# Patient Record
Sex: FEMALE | Race: WHITE | Employment: FULL TIME | URBAN - NONMETROPOLITAN AREA
[De-identification: names, ages, dates, MRNs, and addresses within clinical notes are randomized per-mention and may not be internally consistent; named-entity substitution may affect disease eponyms.]

---

## 2018-07-24 ENCOUNTER — OFFICE VISIT (OUTPATIENT)
Dept: ORTHOPEDIC SURGERY | Age: 63
End: 2018-07-24

## 2018-07-24 VITALS
BODY MASS INDEX: 23.32 KG/M2 | SYSTOLIC BLOOD PRESSURE: 107 MMHG | HEART RATE: 69 BPM | HEIGHT: 65 IN | WEIGHT: 140 LBS | DIASTOLIC BLOOD PRESSURE: 69 MMHG

## 2018-07-24 DIAGNOSIS — M67.951 TENDINOPATHY OF RIGHT GLUTEUS MEDIUS: ICD-10-CM

## 2018-07-24 DIAGNOSIS — M25.551 RIGHT HIP PAIN: Primary | ICD-10-CM

## 2018-07-24 DIAGNOSIS — M70.61 TROCHANTERIC BURSITIS OF RIGHT HIP: ICD-10-CM

## 2018-07-24 PROCEDURE — 99203 OFFICE O/P NEW LOW 30 MIN: CPT | Performed by: ORTHOPAEDIC SURGERY

## 2018-07-24 RX ORDER — ESTRADIOL 0.03 MG/D
1 FILM, EXTENDED RELEASE TRANSDERMAL DAILY
COMMUNITY
End: 2020-07-06

## 2018-07-24 RX ORDER — ACETAMINOPHEN 160 MG/5ML
15 SOLUTION ORAL EVERY 4 HOURS PRN
COMMUNITY
End: 2020-07-06

## 2018-07-24 RX ORDER — IBUPROFEN 200 MG
200 TABLET ORAL EVERY 6 HOURS PRN
COMMUNITY
End: 2020-07-06

## 2018-07-24 NOTE — PROGRESS NOTES
Madiha Diaz MD  Orthopaedic Surgery & Sports Medicine  Shoulder, Hip & Knee Specialist                       MAIN PHONE NUMBER  [phone]      PATIENT: Forrest Joe    61 y.o.  female  Beckie: 1955   MRN:  [id number]       Date of current encounter: 7/24/2018  This encounter is evaluated as a:       [x] New Patient Visit    [] Established Patient Visit   [] Post-Op Visit     [] Consult: requested by          [] Worker's Comp       Patient's PCP is Dr. Shruti Morgan MD    Subjective:     Chief Complaint   Patient presents with    New Patient     Rt Hip pain pt states that this is a reacurrance of a recent injury which started last November       HPI:  Patient is a very nice 57-year-old woman who comes in today for evaluation of 12 month history of lateral sided right hip pain. She states that the pain was a problem several years ago but recently this has recurred. There is no pain radiating down the leg. There is no pain radiating to the groin. Pain Assessment  Location of Pain:  (hip)  Location Modifiers: Right  Severity of Pain: 3  Quality of Pain: Dull, Aching  Duration of Pain: Other (Comment) (comes and goes)  Frequency of Pain: Intermittent  Date Pain First Started: 07/24/17  Aggravating Factors: Stairs, Walking, Squatting, Exercise  Limiting Behavior: Yes  Relieving Factors: Nsaids, Rest  Result of Injury: No  Work-Related Injury: No  Are there other pain locations you wish to document?: No    There is no problem list on file for this patient. No past medical history on file. No past surgical history on file.     Allergies:  Ciprofloxacin and Codeine    Medications:  Outpatient Prescriptions Marked as Taking for the 7/24/18 encounter (Office Visit) with Madiha Diaz MD   Medication Sig Dispense Refill    Fexofenadine HCl (ALLEGRA ALLERGY PO) Take 180 mg by mouth as needed      ibuprofen (ADVIL;MOTRIN) 200 MG tablet Take 200 mg by mouth every 6 hours as needed for Pain      acetaminophen (TYLENOL) 160 MG/5ML solution Take 15 mg/kg by mouth every 4 hours as needed for Fever      estradiol 0.025 MG/24HR PTTW Place 1 patch onto the skin daily       Social History     Social History    Marital status:      Spouse name: N/A    Number of children: N/A    Years of education: N/A     Occupational History    Not on file. Social History Main Topics    Smoking status: Never Smoker    Smokeless tobacco: Never Used    Alcohol use Not on file    Drug use: Unknown    Sexual activity: Not on file     Other Topics Concern    Not on file     Social History Narrative    No narrative on file     No family history on file. Review of Systems:    [employer] [facility] [employer] [facility] [employer] [facility]'s reported review of systems has been reviewed and has been scanned into her medical record for today's visit. The scanned image can be found in media images folder. She was instructed to contact her primary care physician regarding ROS positives if not already being addressed during today's visit. Objective:   Physical Exam  Vital Signs:  /69   Pulse 69   Ht 5' 5\" (1.651 m)   Wt 140 lb (63.5 kg)   BMI 23.30 kg/m²     Constitution:  Generally, Tere Mann is [x] alert, [x] appears stated age, and [x] in no distress.   Her general body habitus is [] Cachectic  [] Thin  [x] Normal  [] Obese  [] Morbidly Obese    Head: [x] Normocephalic  Eyes: [x] Extra-occular muscles intact  Left Ear: [x] External Ear normal   Right Ear: [x] External Ear normal   Nose: [x] Normal  Mouth: [x] Oral mucosa moist  [x] No perioral lesions    Pulmonary: [x] Respirations unlabored and regular  Skin: [x] Warm [x] Well perfused     Psychiatric:   [x] Good judgement and insight  [x] Oriented to [x] person, [x] place, and [x] time  [x] Mood appropriate for circumstances    Gait:   Gait is [x] Normal  [] Impaired   Assistive Device: [] None  [] Knee Brace  [] Cane  [] Crutches   [] Kate Cristino   [] Wheelchair Kaitlynn Soto is a 61y.o. year old female who presents with Right sided Greater trochanteric pain syndrome. This is a condition which is comprised of trochanteric bursitis, IT band focal tightness, and gluteus medius tendinopathy. It is a chronic condition very commonly seen in this age group. Generally, speaking extra-articular etiologies of hip pain respond extremely well to nonoperative management involving PT, injections and/or NSAIDs, although the rate of clinical improvement is slow and requires patience and consistency with therapy. Diagnosis:    Diagnosis Orders   1. Right hip pain  XR HIP RIGHT (2-3 VIEWS)    [facility] PT - [facility]   2. Tendinopathy of right gluteus medius  [facility] PT - [facility]   3. Trochanteric bursitis of right hip  [facility] PT - [facility]         Plan:     I discussed the diagnosis and the treatment options with Kaitlynn Soto today as well as the nature of the condition. I am going to refer for PT using my specialized program for this condition. Patient wanted to hold off on an injection and I think this is reasonable. Return to Clinic/Follow - Up:  6-8 weeks PRN    Kaitlynn oSto was instructed to call the office if her symptoms worsen or if new symptoms appear prior to the next scheduled visit. She is specifically instructed to contact the office between now & her scheduled appointment if she has concerns related to her condition or if she needs assistance in scheduling the above tests. She is welcome to call for an appointment sooner if she has any additional concerns or questions. Patient Education Materials Provided:  [x] Dr Pepper Britt: New patient folder,  Anatomic Drawings and treatment algorithms    There are no Patient Instructions on file for this visit.        Orders Placed This Encounter   Procedures    XR HIP RIGHT (2-3 VIEWS)     Order Specific Question:   Reason for exam:     Answer:   AP and Marcie Knight [facility] Physical Therapy     Referral Priority:   Routine     Referral Type:   Eval and Treat     Referral Reason:   Specialty Services Required     Requested Specialty:   Physical Therapy     Number of Visits Requested:   1       Refills/New Prescriptions:  No orders of the defined types were placed in this encounter. Today's prescription medications will be e-scribed (when appropriate) to the Patient's Preferred Pharmacy:   [address] [facility] [phone]  [address] [place] [address]  Phone: [phone] Fax: [phone]         Russel Nair MD  Orthopaedic Surgeon, Sports Medicine  Director, Hip Arthroscopy and [address] [place] [address]    Contact Information:  ([place] [address] [phone])  [facility] main number: use after hours [phone])    This dictation was performed with a verbal recognition program (DRAGON) and it was checked for errors. It is possible that there are still dictated errors within this office note. If so, please bring any errors to my attention for an addendum. All efforts were made to ensure that this office note is accurate.

## 2018-07-24 NOTE — LETTER
Hip Rehabilitation Referral    Patient Name: Aggie Qureshi      YOB: 1955    Diagnosis: Right Greater Trochanteric Bursitis / Hip pain     Precautions: N/A    Evaluate and Treat          Stretching and soft tissue mobs:  Strengthening:  [x] IT Band     [x] Core stabilization  [x] Adductors     [x] Glute eccentric progressing to concentric  [x] Hip Flexors     [] Pelvic and trunk strengthening  [x] Gluteals     [] Abductors     [x] Iliopsoas complex    [] Flexors  [x] Rectus femoris     [x] Progressive resistive exercises  [] TFL        [] Sartorius                     Activities:       [x] Kinematic Retraining       [x] Activity modification     [x] Patient education to avoid continued irritation with ADLs  [x] Normalization of gait    Modalities:     Return to Sport:  [x] Ultrasound     [] Plyometrics  [] Iontophoresis    [] Rhythmic stabilization  [x] Moist heat     [] Core strengthening   [x] Massage     [] Sports specific program:      [x] Cryotherapy      [] Electrical stimulation     [] Paraffin  [] Whirlpool  [] TENS  [x] Per therapist discretion  [x] Home exercise program (copy to patient). Perform exercises for:  30    minutes   2- 3      times/day  [x] Supervised physical therapy  Frequency: []  1x week  [x] 2x week  [] 3x week  [] Other:   Duration: [] 2 weeks   [] 4 weeks  [x] 6 weeks  [] Other:     Additional Instructions:       Hip pain   Focus on core stabilization, glute & eccentric hip flexor strengthening progressing to concentric. Soft tissue mobs focus on hip adductors, rectus femoris, iliopsoas, TFL, & gluteals. Kinematic re-training and activity modification as indicated. Trochanteric Bursitis/Glutes Medius Tendinitis/tendinopathy:   Soft tissue mobs to Glutes,  Adductors,  ITB, primary and secondary hip flexors. Core stabilization, pelvic and trunk control exercise, Glute strengthening.   Activity modification and Pt education to avoid continued

## 2018-08-01 ENCOUNTER — HOSPITAL ENCOUNTER (OUTPATIENT)
Dept: OTHER | Age: 63
Discharge: HOME | End: 2018-08-01
Attending: ORTHOPAEDIC SURGERY | Admitting: ORTHOPAEDIC SURGERY

## 2018-08-01 ENCOUNTER — HOSPITAL ENCOUNTER (OUTPATIENT)
Dept: PHYSICAL THERAPY | Age: 63
Discharge: HOME | End: 2018-08-31
Admitting: ORTHOPAEDIC SURGERY

## 2018-08-01 NOTE — PLAN OF CARE
[address] [place] [address] Marisa Carrillo  Phone: [phone]   Fax:     [phone]                                                       Physical Therapy Certification    Dear Referring Practitioner: Baldo St,    We had the pleasure of evaluating the following patient for physical therapy services at [address] [place] [address]. A summary of our findings can be found in the initial assessment below. This includes our plan of care. If you have any questions or concerns regarding these findings, please do not hesitate to contact me at the office phone number checked above. Thank you for the referral.       Physician Signature:_______________________________Date:__________________  By signing above (or electronic signature), therapists plan is approved by physician      Patient: Garry oG   : 1955   MRN: [id number]  Referring Physician: Referring Practitioner: Baldo St      Evaluation Date: 2018      Medical Diagnosis Information:  Diagnosis: Right hip pain, glutes medius tendinopathy, trochanteric bursitis   Treatment Diagnosis: M25.551, M67.98, M70.61                                         Insurance information: PT Insurance Information: UMR     Precautions/ Contra-indications: None  Latex Allergy:  [x]NO      []YES  Preferred Language for Healthcare:   [x]English       []other:    SUBJECTIVE: Patient stated complaint: Patient reports onset of acute-on-chronic right hip pain with most recent exacerbation starting sometime in the fall of last year. She moved into the old home that she and her  rent out as a bed and breakfast and she states that there are lots of stairs in the house, all the repetitive up and down has really been bothering her hip.  She has dealt with this kind of hip pain in the past and with visits to the chiropractor for \"scraping\" it went away. This time she has been seeing the chiropractor almost weekly since January and these sessions give her some relief of hip pain, but her symptoms are basically back to baseline after a couple days. She has been receiving adjustments and acupuncture. She also gets medical massage about once a month which does help, but nothing has given her lasting relief. She has not had physical therapy yet for this condition, declined steroid injection from Dr. Camryn Foreman. X-rays were negative. MRI revealed greater trochanteric bursitis and gluteus medius tendinopathy. She reports pain originates on the lateral aspect of the hip but does radiate into the anterior thigh to the knee, and into the top of the right buttock. Has difficulty sleeping at night due to pain with lying on the right side. She denies low back pain. Relevant Medical History: Chronic greater trochanteric bursitis  Functional Disability Index:PT G-Codes  Functional Assessment Tool Used: LEFS  Score: 31% impairment  Functional Limitation: Mobility: Walking and moving around  Mobility: Walking and Moving Around Current Status (): At least 20 percent but less than 40 percent impaired, limited or restricted  Mobility: Walking and Moving Around Goal Status ():  At least 1 percent but less than 20 percent impaired, limited or restricted    Pain Scale: 4-5/10  Easing factors: rest, massage, acupuncture  Provocative factors: sleeping, prolonged standing, prolonged ambulation, squatting, stairs, kneeling, transfers     Type: []Constant   [x]Intermittent  [x]Radiating []Localized []other:     Numbness/Tingling: None     Occupation/School: ,  at [employer] - involves a lot of standing, climbing    Living Status/Prior Level of Function: Independent with ADLs and IADLs    OBJECTIVE:     ROM LEFT RIGHT   HIP Flex WNL WNL   HIP Abd WNL WNL   HIP Ext WNL WNL   HIP IR WNL WNL   HIP ER WNL Decreased   Knee ext Hyper Hyper   Knee Flex WNL WNL   Strength  LEFT RIGHT   HIP Flexors 5/5 4-/5   HIP Abductors 4/5 3/5   HIP Ext 4+/5 3+/5   Hip ER     Knee EXT (quad)     Knee Flex (HS)       Reflexes/Sensation:    [x]Dermatomes/Myotomes intact    [x]Reflexes equal and normal bilaterally   []Other:    Joint mobility:    []Normal    [x]Hypo - R hip   []Hyper    Palpation: Tender to palpation of right IT band, piriformis, glute med/min    Functional Mobility/Transfers: decreased pelvic control with unilateral stance on RLE    Posture: WNL    Bandages/Dressings/Incisions: NA    Gait: (include devices/WB status) antalgic    Orthopedic Special Tests: NA                       [x] Patient history, allergies, meds reviewed. Medical chart reviewed. See intake form. Review Of Systems (ROS):  [x]Performed Review of systems (Integumentary, CardioPulmonary, Neurological) by intake and observation. Intake form has been scanned into medical record. Patient has been instructed to contact their primary care physician regarding ROS issues if not already being addressed at this time.       Co-morbidities/Complexities (which will affect course of rehabilitation):   []None           Arthritic conditions   []Rheumatoid arthritis (M05.9)  []Osteoarthritis (M19.91)   Cardiovascular conditions   []Hypertension (I10)  []Hyperlipidemia (E78.5)  []Angina pectoris (I20)  []Atherosclerosis (I70)  []CVA Musculoskeletal conditions   []Disc pathology   []Congenital spine pathologies   []Prior surgical intervention  []Osteoporosis (M81.8)  []Osteopenia (M85.8)   Endocrine conditions   []Hypothyroid (E03.9)  []Hyperthyroid Gastrointestinal conditions   []Constipation (I27.93)   Metabolic conditions   []Morbid obesity (E66.01)  []Diabetes type 1(E10.65) or 2 (E11.65)   []Neuropathy (G60.9)     Pulmonary conditions   []Asthma (J45)  []Coughing   []COPD (J44.9)   Psychological Disorders  [x]Anxiety (F41.9)  [x]Depression (F32.9)   []Other:   []Other: Barriers to/and or personal factors that will affect rehab potential:              []Age  []Sex    []Smoker              []Motivation/Lack of Motivation                        []Co-Morbidities              []Cognitive Function, education/learning barriers              []Environmental, home barriers              []profession/work barriers  []past PT/medical experience  []other:  Justification: No barriers noted    Falls Risk Assessment (30 days):   [x] Falls Risk assessed and no intervention required. [] Falls Risk assessed and Patient requires intervention due to being higher risk   TUG score (>12s at risk):     [] Falls education provided, including       G-Codes:  PT G-Codes  Functional Assessment Tool Used: LEFS  Score: 31% impairment  Functional Limitation: Mobility: Walking and moving around  Mobility: Walking and Moving Around Current Status (): At least 20 percent but less than 40 percent impaired, limited or restricted  Mobility: Walking and Moving Around Goal Status (): At least 1 percent but less than 20 percent impaired, limited or restricted    ASSESSMENT: Patient is a 77-year-old female who presents to physical therapy with signs and symptoms consistent with right hip greater trochanteric bursitis and gluteus medius tendinopathy.    Functional Impairments:     [x]Noted lumbar/proximal hip/LE hypomobility   []Decreased LE functional ROM   [x]Decreased core/proximal hip strength and neuromuscular control   [x]Decreased LE functional strength   [x]Reduced balance/proprioceptive control   []other:      Functional Activity Limitations (from functional questionnaire and intake)   [x]Reduced ability to tolerate prolonged functional positions   [x]Reduced ability or difficulty with changes of positions or transfers between positions   [x]Reduced ability to maintain good posture and demonstrate good body mechanics with sitting, bending, and lifting   [x]Reduced ability to sleep   [] Reduced ability or tolerance with driving and/or computer work   []Reduced ability to perform lifting, carrying tasks   [x]Reduced ability to squat   []Reduced ability to forward bend   [x]Reduced ability to ambulate prolonged functional periods/distances/surfaces   [x]Reduced ability to ascend/descend stairs   [x]Reduced ability to run, hop or jump   []other:     Participation Restrictions   []Reduced participation in self care activities   [x]Reduced participation in home management activities   [x]Reduced participation in work activities   [x]Reduced participation in social activities. [x]Reduced participation in sport activities. Classification :    []Signs/symptoms consistent with post-surgical status including decreased ROM, strength and function.    []Signs/symptoms consistent with joint sprain/strain   []Signs/symptoms consistent with patella-femoral syndrome   []Signs/symptoms consistent with knee OA/hip OA   []Signs/symptoms consistent with internal derangement of knee/Hip   [x]Signs/symptoms consistent with functional hip weakness/NMR control      [x]Signs/symptoms consistent with tendinitis/tendinosis    [x]signs/symptoms consistent with pathology which may benefit from Dry needling      []other:      Prognosis/Rehab Potential:      [x]Excellent   []Good    []Fair   []Poor    Tolerance of evaluation/treatment:    [x]Excellent   []Good    []Fair   []Poor    Physical Therapy Evaluation Complexity Justification  [x] A history of present problem with:  [x] no personal factors and/or comorbidities that impact the plan of care;  []1-2 personal factors and/or comorbidities that impact the plan of care  []3 personal factors and/or comorbidities that impact the plan of care  [x] An examination of body systems using standardized tests and measures addressing any of the following: body structures and functions (impairments), activity limitations, and/or participation restrictions;:  [x] a total of 1-2 or more elements   [] a

## 2018-08-01 NOTE — FLOWSHEET NOTE
coordination, kinesthetic sense, posture, motor skill, proprioception of core, proximal hip and LE for self care, mobility, lifting, and ambulation/stair navigation      Manual Treatments:  PROM / STM / Oscillations-Mobs:  G-I, II, III, IV (PA's, Inf., Post.)  [x] (73189) Provided manual therapy to mobilize LE, proximal hip and/or LS spine soft tissue/joints for the purpose of modulating pain, promoting relaxation,  increasing ROM, reducing/eliminating soft tissue swelling/inflammation/restriction, improving soft tissue extensibility and allowing for proper ROM for normal function with self care, mobility, lifting and ambulation. Modalities:  None    Charges:  Timed Code Treatment Minutes: 25   Total Treatment Minutes: 48     [x] EVAL (LOW) 77502 (typically 20 minutes face-to-face)  [] EVAL (MOD) 68754 (typically 30 minutes face-to-face)  [] EVAL (HIGH) 50567 (typically 45 minutes face-to-face)  [] RE-EVAL     [x] JN(08990) x  1   [] IONTO  [] NMR (71442) x      [] VASO  [x] Manual (77001) x  1    [] Other:  [] TA x       [] Mech Traction (92738)  [] ES(attended) (57153)      [] ES (un) (85136):     GOALS:  Patient stated goal: pain-free, return to PLOF    Therapist goals for Patient:   Short Term Goals: To be achieved in: 2 weeks  1. Independent in HEP and progression per patient tolerance, in order to prevent re-injury. 2. Patient will have a decrease in pain to facilitate improvement in movement, function, and ADLs as indicated by Functional Deficits. Long Term Goals: To be achieved in: 6 weeks  1. Disability index score of 18% or less for the LEFS to assist with reaching prior level of function. 2. Patient will demonstrate increased AROM to equal contralateral to allow for proper joint functioning as indicated by patients Functional Deficits.    3. Patient will demonstrate an increase in Strength to good proximal hip strength and control, within 5lb HHD in LE to allow for proper functional mobility as

## 2018-08-03 ENCOUNTER — HOSPITAL ENCOUNTER (OUTPATIENT)
Dept: PHYSICAL THERAPY | Age: 63
Discharge: HOME | End: 2018-08-04
Admitting: ORTHOPAEDIC SURGERY

## 2018-08-06 ENCOUNTER — HOSPITAL ENCOUNTER (OUTPATIENT)
Dept: PHYSICAL THERAPY | Age: 63
Discharge: HOME | End: 2018-08-07
Admitting: ORTHOPAEDIC SURGERY

## 2018-08-06 NOTE — FLOWSHEET NOTE
[facility] [address] [place] Marisa Herrera  Phone: [phone] Fax: [phone]    Physical Therapy Daily Treatment Note  Date:  2018    Patient Name:  Forrest Joe    :  1955  MRN: [id number]  Restrictions/Precautions:    Medical/Treatment Diagnosis Information:  · Diagnosis: Right hip pain, glutes medius tendinopathy, trochanteric bursitis  · Treatment Diagnosis: M25.551, M67.98, U35.15  Insurance/Certification information:  PT Insurance Information: UMR  Physician Information:  Referring Practitioner: Kennth Care  Plan of care signed (Y/N):     Date of Patient follow up with Physician:     G-Code (if applicable):      Date G-Code Applied:  18       Progress Note: [x]  Yes  []  No  Next due by: Visit #10       Latex Allergy:  [x]NO      []YES  Preferred Language for Healthcare:   [x]English       []other:    Visit # Insurance Allowable   3 60     Pain level:  4/10     SUBJECTIVE:  Patient states that she did not feel much improvement in symptoms following last session. She has been noticing more soreness in the front of her hip with the exercises, though she can feel that the posterior hip is working a little bit more than it was. She is worried that maybe she shoulder have gotten the cortisone injection. She consents to trying dry needling today.      OBJECTIVE:   Observation:   Test measurements:      RESTRICTIONS/PRECAUTIONS: None    Exercises/Interventions:     Therapeutic Ex - 14 min Sets/sec Reps Notes   BIKE 0     Alter G      Bridge - DL 10 10       add   3 way SLR      Prone heel push 5 10    Clam ABD 1 10 No resistance   Hip Ext /table      BOSU squat      Leg Press Iso/Con/Ecc 0-      Cybex HS curl      TKE      Glute side walks      RDL      Slide Lunge      Slide HS eccentrics      Step ups/ecc step down      Swissball wall rolls- in SLS- hip drive      Seated piriformis stretch 30 3 each

## 2018-08-08 ENCOUNTER — HOSPITAL ENCOUNTER (OUTPATIENT)
Dept: PHYSICAL THERAPY | Age: 63
Discharge: HOME | End: 2018-08-09
Admitting: ORTHOPAEDIC SURGERY

## 2018-08-08 NOTE — FLOWSHEET NOTE
[facility] [address] [facility]  Phone: [phone] Fax: [phone]    Physical Therapy Daily Treatment Note  Date:  2018    Patient Name:  Elijah Mohamud    :  1955  MRN: [id number]  Restrictions/Precautions:    Medical/Treatment Diagnosis Information:  · Diagnosis: Right hip pain, glutes medius tendinopathy, trochanteric bursitis  · Treatment Diagnosis: M25.551, M67.98, F49.95  Insurance/Certification information:  PT Insurance Information: UMR  Physician Information:  Referring Practitioner: Kentrell Fajardo  Plan of care signed (Y/N):     Date of Patient follow up with Physician:     G-Code (if applicable):      Date G-Code Applied:  18       Progress Note: [x]  Yes  []  No  Next due by: Visit #10       Latex Allergy:  [x]NO      []YES  Preferred Language for Healthcare:   [x]English       []other:    Visit # Insurance Allowable   4 60     Pain level:  2/10     SUBJECTIVE:  Patient states that the needling session went really well, she really did not notice any increased soreness afterward and she has noticed some improvement in her hip mobility ever since. Still feeling a lot of stiffness in the anterior hip. Still has pain with stairs and floor transfers due to pain. OBJECTIVE:   Observation:   Test measurements:      RESTRICTIONS/PRECAUTIONS: None    Exercises/Interventions:     Therapeutic Ex - 20 min Sets/sec Reps Notes   BIKE 5'     Alter G      Bridge - DL 10 10          3 way SLR      Clam ABD 5 10 No resistance   Hip Ext /table      BOSU squat      Leg Press Iso/Con/Ecc 0-      Kneeling alt UE/LE 5 10    TRX squat   add   Glute side walks   add   RDL   Add? (DL)   Slide Lunge      Slide HS eccentrics      Step ups/ecc step down      Swissball wall rolls- in SLS- hip drive   Add?    Seated piriformis stretch 30 3 each   Kneeling hip flexor stretch 30 3 each         Manual Intervention - 18 min      IASTM / STM 8'  Right IT band, glutes, piriformis   Prone figure 4 mob 4'     Hip belt mobs      Hip PROM/stretching 6'  Prone, supine   DN 0'  Held today         NMR re-education - 8 min      Belgian/Biofeedback 10/10      BFR      G. Med activation/sidelying      G. Max Activation/prone 10 10 Prone, table bent   Hip Ext full ROM G. Activation      Bosu Bal and Prop- G Med      Bosu Retro G. Med act      Prone Hip froggers- sliders/elevated 10 10 Prone, table bent             Therapeutic Exercise and NMR EXR  [x] (80033) Provided verbal/tactile cueing for activities related to strengthening, flexibility, endurance, ROM for improvements in LE, proximal hip, and core control with self care, mobility, lifting, ambulation. [x] (30550) Provided verbal/tactile cueing for activities related to improving balance, coordination, kinesthetic sense, posture, motor skill, proprioception  to assist with LE, proximal hip, and core control in self care, mobility, lifting, ambulation and eccentric single leg control.      NMR and Therapeutic Activities:    [] (65775 or 35498) Provided verbal/tactile cueing for activities related to improving balance, coordination, kinesthetic sense, posture, motor skill, proprioception and motor activation to allow for proper function of core, proximal hip and LE with self care and ADLs  [] (28694) Gait Re-education- Provided training and instruction to the patient for proper LE, core and proximal hip recruitment and positioning and eccentric body weight control with ambulation re-education including up and down stairs     Home Exercise Program:    [x] (25334) Reviewed/Progressed HEP activities related to strengthening, flexibility, endurance, ROM of core, proximal hip and LE for functional self-care, mobility, lifting and ambulation/stair navigation   [] (72091)Reviewed/Progressed HEP activities related to improving balance, coordination, kinesthetic sense, posture, motor skill, Patient will return to prolonged standing, prolonged ambulation, squatting, stairs, kneeling, transfers functional activities without increased symptoms or restriction. 5. Patient will report ability to sleep throughout night without waking due to pain. Progression Towards Functional goals:  [x] Patient is progressing as expected towards functional goals listed. [] Progression is slowed due to complexities listed. [] Progression has been slowed due to co-morbidities. [] Plan just implemented, too soon to assess goals progression  [] Other:     ASSESSMENT:  Improvemnet in symptoms following manual. Continued soft tissue restrictions in IT band, piriformis, and hip flexors. Added kneeling hip flexor stretch to HEP. Needs improved glute med/max strength to improve pelvic stability in SLS and with squatting/lunging. Return to Play: (if applicable)   []  Stage 1: Intro to Strength   []  Stage 2: Return to Run and Strength   []  Stage 3: Return to Jump and Strength   []  Stage 4: Dynamic Strength and Agility   []  Stage 5: Sport Specific Training     []  Ready to Return to Play, Meets All Above Stages   []  Not Ready for Return to Sports   Comments:                         Treatment/Activity Tolerance:  [x] Patient tolerated treatment well [] Patient limited by fatique  [] Patient limited by pain  [] Patient limited by other medical complications  [] Other:     Prognosis: [x] Good [] Fair  [] Poor    Patient Requires Follow-up: [x] Yes  [] No      PLAN: Continue manual, progress proximal hip strengthening as tolerated.    [x] Continue per plan of care [] Alter current plan (see comments)  [] Plan of care initiated [] Hold pending MD visit [] Discharge    Electronically signed by: Hamlet Bellamy PT

## 2018-08-13 ENCOUNTER — HOSPITAL ENCOUNTER (OUTPATIENT)
Dept: PHYSICAL THERAPY | Age: 63
Discharge: HOME | End: 2018-08-14
Admitting: ORTHOPAEDIC SURGERY

## 2018-08-13 NOTE — FLOWSHEET NOTE
[address] [place] [address]  Phone: [phone] Fax: [phone]    Physical Therapy Daily Treatment Note  Date:  2018    Patient Name:  Forrest Joe    :  1955  MRN: [id number]  Restrictions/Precautions:    Medical/Treatment Diagnosis Information:  · Diagnosis: Right hip pain, glutes medius tendinopathy, trochanteric bursitis  · Treatment Diagnosis: M25.551, M67.98, G82.45  Insurance/Certification information:  PT Insurance Information: UMR  Physician Information:  Referring Practitioner: Kennth Care  Plan of care signed (Y/N):     Date of Patient follow up with Physician:     G-Code (if applicable):      Date G-Code Applied:  18       Progress Note: [x]  Yes  []  No  Next due by: Visit #10       Latex Allergy:  [x]NO      []YES  Preferred Language for Healthcare:   [x]English       []other:    Visit # Insurance Allowable   5 60     Pain level:  0-2/10     SUBJECTIVE:  Patient states that her hip is feeling some better. Pt has the most difficulty/discomfort with stairs, particularly ascending stairs. Pt reports compliance c HEP. OBJECTIVE:   Observation:   Test measurements:      RESTRICTIONS/PRECAUTIONS: None    Exercises/Interventions:     Therapeutic Ex - 20 min Sets/sec Reps Notes   BIKE 5'     Marina Albarado - DL 10 10    Sportco      3 way SLR      Clam ABD 5 10 No resistance, less pull through medial thigh with knees pulled up   Hip Ext /table      BOSU squat      Leg Press Iso/Con/Ecc 0-      Kneeling alt UE/LE 5 10 Airex   TRX squat   add   Glute side walks   add   RDL   Add? (DL)   Slide Lunge      Slide HS eccentrics      Step ups/ecc step down      Swissball wall rolls- in SLS- hip drive   Add?    Seated piriformis & glute stretch 30 3 each   Kneeling hip flexor stretch 30 3 each         Manual Intervention - 17 min      IASTM / STM 6'  Right IT band, glutes, piriformis lifting, and ambulation/stair navigation      Manual Treatments:  PROM / STM / Oscillations-Mobs:  G-I, II, III, IV (PA's, Inf., Post.)  [x] (32869) Provided manual therapy to mobilize LE, proximal hip and/or LS spine soft tissue/joints for the purpose of modulating pain, promoting relaxation,  increasing ROM, reducing/eliminating soft tissue swelling/inflammation/restriction, improving soft tissue extensibility and allowing for proper ROM for normal function with self care, mobility, lifting and ambulation. Modalities:  None    Charges:  Timed Code Treatment Minutes: 46   Total Treatment Minutes: 46     [] EVAL (LOW) 47018 (typically 20 minutes face-to-face)  [] EVAL (MOD) 49891 (typically 30 minutes face-to-face)  [] EVAL (HIGH) 75091 (typically 45 minutes face-to-face)  [] RE-EVAL     [x] FT(24838) x  1   [] IONTO  [x] NMR (92846) x  1   [] VASO  [x] Manual (03503) x  1    [] Other:  [] TA x       [] Mech Traction (20403)  [] ES(attended) (76170)      [] ES (un) (35488):     GOALS:  Patient stated goal: pain-free, return to PLOF    Therapist goals for Patient:   Short Term Goals: To be achieved in: 2 weeks  1. Independent in HEP and progression per patient tolerance, in order to prevent re-injury. 2. Patient will have a decrease in pain to facilitate improvement in movement, function, and ADLs as indicated by Functional Deficits. Long Term Goals: To be achieved in: 6 weeks  1. Disability index score of 18% or less for the LEFS to assist with reaching prior level of function. 2. Patient will demonstrate increased AROM to equal contralateral to allow for proper joint functioning as indicated by patients Functional Deficits. 3. Patient will demonstrate an increase in Strength to good proximal hip strength and control, within 5lb HHD in LE to allow for proper functional mobility as indicated by patients Functional Deficits.    4. Patient will return to prolonged standing, prolonged ambulation,

## 2018-08-17 ENCOUNTER — HOSPITAL ENCOUNTER (OUTPATIENT)
Dept: PHYSICAL THERAPY | Age: 63
Discharge: HOME | End: 2018-08-18
Admitting: ORTHOPAEDIC SURGERY

## 2018-08-17 NOTE — FLOWSHEET NOTE
activities related to improving balance, coordination, kinesthetic sense, posture, motor skill, proprioception and motor activation to allow for proper function of core, proximal hip and LE with self care and ADLs  [] (86815) Gait Re-education- Provided training and instruction to the patient for proper LE, core and proximal hip recruitment and positioning and eccentric body weight control with ambulation re-education including up and down stairs     Home Exercise Program:    [x] (14388) Reviewed/Progressed HEP activities related to strengthening, flexibility, endurance, ROM of core, proximal hip and LE for functional self-care, mobility, lifting and ambulation/stair navigation   [] (36354)Reviewed/Progressed HEP activities related to improving balance, coordination, kinesthetic sense, posture, motor skill, proprioception of core, proximal hip and LE for self care, mobility, lifting, and ambulation/stair navigation      Manual Treatments:  PROM / STM / Oscillations-Mobs:  G-I, II, III, IV (PA's, Inf., Post.)  [x] (01430) Provided manual therapy to mobilize LE, proximal hip and/or LS spine soft tissue/joints for the purpose of modulating pain, promoting relaxation,  increasing ROM, reducing/eliminating soft tissue swelling/inflammation/restriction, improving soft tissue extensibility and allowing for proper ROM for normal function with self care, mobility, lifting and ambulation.          Modalities:  None    Charges:  Timed Code Treatment Minutes: 51   Total Treatment Minutes: 51     [] EVAL (LOW) 72867 (typically 20 minutes face-to-face)  [] EVAL (MOD) 58719 (typically 30 minutes face-to-face)  [] EVAL (HIGH) 55019 (typically 45 minutes face-to-face)  [] RE-EVAL     [x] SM(92269) x  2   [] IONTO  [] NMR (70198) x      [] VASO  [x] Manual (50256) x  1    [] Other:  [] TA x       [] Mech Traction (42227)  [] ES(attended) (89589)      [] ES (un) (89019):     GOALS:  Patient stated goal: pain-free, return to

## 2018-08-20 ENCOUNTER — HOSPITAL ENCOUNTER (OUTPATIENT)
Dept: PHYSICAL THERAPY | Age: 63
Discharge: HOME | End: 2018-08-21
Admitting: ORTHOPAEDIC SURGERY

## 2018-08-20 NOTE — FLOWSHEET NOTE
[address] [place] [address] Marisa Lackey  Phone: [phone] Fax: [phone]    Physical Therapy Daily Treatment Note  Date:  2018    Patient Name:  Forrest Joe    :  1955  MRN: [id number]  Restrictions/Precautions:    Medical/Treatment Diagnosis Information:  · Diagnosis: Right hip pain, glutes medius tendinopathy, trochanteric bursitis  · Treatment Diagnosis: M25.551, M67.98, X39.48  Insurance/Certification information:  PT Insurance Information: UMR  Physician Information:  Referring Practitioner: Madiha Care  Plan of care signed (Y/N):     Date of Patient follow up with Physician:     G-Code (if applicable):      Date G-Code Applied:  18       Progress Note: [x]  Yes  []  No  Next due by: Visit #10       Latex Allergy:  [x]NO      []YES  Preferred Language for Healthcare:   [x]English       []other:    Visit # Insurance Allowable   7 60     Pain level:  0-2/10     SUBJECTIVE:  Patient states that she has not been feeling as much of the lingering anterior hip pain recently, though she does still feel some shooting pain in the anterior hip when ascending a step. She still has difficulty with performing clamshells as she cannot feel it in the proper spot. She still feels achiness and point tenderness around the lateral aspect of the hip. She is willing to have dry needling performed again.      OBJECTIVE:    Observation:   Test measurements:      RESTRICTIONS/PRECAUTIONS: None    Exercises/Interventions:     Therapeutic Ex - 0 min Sets/sec Reps Notes   BIKE 5'  Light resistance   Marina Albarado - DL 10 10    Sportco      3 way SLR      Hip Ext Dannielle Huerta      BOSU squat      Leg Press Iso/Con/Ecc 0-      Kneeling alt UE/LE 5 10 Airex   TRX squat 5s 10 Cues for squat positioning   Glute side walks 3 10 Short distance, orange loop above knees as Pt had some knee discomfort with band at shins   RDL   Add? (DL)   Slide Lunge      Slide HS eccentrics      Step ups/ecc step down      Swissball wall rolls- in SLS- hip drive   Add? Seated piriformis & glute stretch 30 3 each   Kneeling hip flexor stretch 30 3 each         Manual Intervention - 27 min      IASTM / STM 2'  Right IT band, glutes, piriformis   Prone figure 4 mob 5'     Hip belt mobs      Hip PROM/stretching 6'  Prone, supine   DN 14'  Held today         NMR re-education - 0 min      Austrian/Biofeedback 10/10      BFR      G. Med activation/sidelying      G. Max Activation/prone 10 10 Prone, table bent   Hip Ext full ROM G. Activation      Bosu Bal and Prop- G Med      Bosu Retro G. Med act      Prone Hip froggers- sliders/elevated 10 8 Prone, table bent-cues for lumbar compensation             Therapeutic Exercise and NMR EXR  [x] (94546) Provided verbal/tactile cueing for activities related to strengthening, flexibility, endurance, ROM for improvements in LE, proximal hip, and core control with self care, mobility, lifting, ambulation. [x] (54250) Provided verbal/tactile cueing for activities related to improving balance, coordination, kinesthetic sense, posture, motor skill, proprioception  to assist with LE, proximal hip, and core control in self care, mobility, lifting, ambulation and eccentric single leg control.      NMR and Therapeutic Activities:    [] (58546 or 50937) Provided verbal/tactile cueing for activities related to improving balance, coordination, kinesthetic sense, posture, motor skill, proprioception and motor activation to allow for proper function of core, proximal hip and LE with self care and ADLs  [] (53848) Gait Re-education- Provided training and instruction to the patient for proper LE, core and proximal hip recruitment and positioning and eccentric body weight control with ambulation re-education including up and down stairs     Home Exercise Program:    [x] (48819) Reviewed/Progressed HEP activities related to strengthening, flexibility, endurance, ROM of core, proximal hip and LE for functional self-care, mobility, lifting and ambulation/stair navigation   [] (37424)Reviewed/Progressed HEP activities related to improving balance, coordination, kinesthetic sense, posture, motor skill, proprioception of core, proximal hip and LE for self care, mobility, lifting, and ambulation/stair navigation      Manual Treatments:  PROM / STM / Oscillations-Mobs:  G-I, II, III, IV (PA's, Inf., Post.)  [x] (40074) Provided manual therapy to mobilize LE, proximal hip and/or LS spine soft tissue/joints for the purpose of modulating pain, promoting relaxation,  increasing ROM, reducing/eliminating soft tissue swelling/inflammation/restriction, improving soft tissue extensibility and allowing for proper ROM for normal function with self care, mobility, lifting and ambulation. Spoke with   regarding the use of Dry Needling     Dry needling manual therapy: consisted on the placement of 6 needles in the following muscles:  right piriformis, glute med, glute min, TFL. A 60-75 mm needle was inserted, piston, rotated, and coned to produce intramuscular mobilization. These techniques were used to restore functional range of motion, reduce muscle spasm and induce healing in the corresponding musculature. (92849)  Clean Technique was utilized today while applying Dry needling treatment. The treatment sites where cleaned with 70% solution of  isopropyl alcohol . The PT washed their hands and utilized treatment gloves along with hand  prior to inserting the needles. All needles where removed and discarded in the appropriate sharps container. MD has given verbal and/or written approval for this treatment. Attended low frequency (1-20Hz) electrical stimulation was utilized in conjunction with Dry Needling:  the Estim was manipulated between all above needles for a period of 12 min. at 4 volts.   The low frequency electrical stimulation was implemented, too soon to assess goals progression  [] Other:     ASSESSMENT:  Good tolerance to dry needling today. Improvement in symptoms following technique. Continued soft tissue restrictions in IT band, piriformis, and hip flexors. Needs improved glute med/max strength to improve pelvic stability in SLS and with squatting/lunging. Return to Play: (if applicable)   []  Stage 1: Intro to Strength   []  Stage 2: Return to Run and Strength   []  Stage 3: Return to Jump and Strength   []  Stage 4: Dynamic Strength and Agility   []  Stage 5: Sport Specific Training     []  Ready to Return to Play, Meets All Above Stages   []  Not Ready for Return to Sports   Comments:                         Treatment/Activity Tolerance:  [x] Patient tolerated treatment well [] Patient limited by fatique  [] Patient limited by pain  [] Patient limited by other medical complications  [] Other:     Prognosis: [x] Good [] Fair  [] Poor    Patient Requires Follow-up: [x] Yes  [] No      PLAN: Continue manual, progress proximal hip strengthening as tolerated.    [x] Continue per plan of care [] Alter current plan (see comments)  [] Plan of care initiated [] Hold pending MD visit [] Discharge    Electronically signed by: Christine Lindsey, PT

## 2018-08-29 ENCOUNTER — HOSPITAL ENCOUNTER (OUTPATIENT)
Dept: PHYSICAL THERAPY | Age: 63
Discharge: HOME | End: 2018-08-30
Admitting: ORTHOPAEDIC SURGERY

## 2018-08-29 NOTE — FLOWSHEET NOTE
endurance, ROM of core, proximal hip and LE for functional self-care, mobility, lifting and ambulation/stair navigation   [] (08111)Reviewed/Progressed HEP activities related to improving balance, coordination, kinesthetic sense, posture, motor skill, proprioception of core, proximal hip and LE for self care, mobility, lifting, and ambulation/stair navigation      Manual Treatments:  PROM / STM / Oscillations-Mobs:  G-I, II, III, IV (PA's, Inf., Post.)  [x] (87699) Provided manual therapy to mobilize LE, proximal hip and/or LS spine soft tissue/joints for the purpose of modulating pain, promoting relaxation,  increasing ROM, reducing/eliminating soft tissue swelling/inflammation/restriction, improving soft tissue extensibility and allowing for proper ROM for normal function with self care, mobility, lifting and ambulation. Modalities:  None    Charges:  Timed Code Treatment Minutes: 48   Total Treatment Minutes: 48     [] EVAL (LOW) 93373 (typically 20 minutes face-to-face)  [] EVAL (MOD) 86402 (typically 30 minutes face-to-face)  [] EVAL (HIGH) 68169 (typically 45 minutes face-to-face)  [] RE-EVAL     [x] ZW(92696) x  1   [] IONTO  [x] NMR (08526) x  1   [] VASO  [x] Manual (54946) x  1    [] Other:  [] TA x       [] Mech Traction (13767)  [] ES(attended) (97346)      [] ES (un) (85945):     GOALS:  Patient stated goal: pain-free, return to PLOF    Therapist goals for Patient:   Short Term Goals: To be achieved in: 2 weeks  1. Independent in HEP and progression per patient tolerance, in order to prevent re-injury. 2. Patient will have a decrease in pain to facilitate improvement in movement, function, and ADLs as indicated by Functional Deficits. Long Term Goals: To be achieved in: 6 weeks  1. Disability index score of 18% or less for the LEFS to assist with reaching prior level of function.    2. Patient will demonstrate increased AROM to equal contralateral to allow for proper joint functioning as indicated by patients Functional Deficits. 3. Patient will demonstrate an increase in Strength to good proximal hip strength and control, within 5lb HHD in LE to allow for proper functional mobility as indicated by patients Functional Deficits. 4. Patient will return to prolonged standing, prolonged ambulation, squatting, stairs, kneeling, transfers functional activities without increased symptoms or restriction. 5. Patient will report ability to sleep throughout night without waking due to pain. Progression Towards Functional goals:  [x] Patient is progressing as expected towards functional goals listed. [] Progression is slowed due to complexities listed. [] Progression has been slowed due to co-morbidities. [] Plan just implemented, too soon to assess goals progression  [] Other:     ASSESSMENT:   Continued soft tissue restrictions in IT band, piriformis, and hip flexors. Needs improved glute med/max strength to improve pelvic stability in SLS and with squatting/lunging. Fatigued quickly with functional squatting exercises. Tends to activate hip flexors more than glutes with weight bearing exercises.     Return to Play: (if applicable)   []  Stage 1: Intro to Strength   []  Stage 2: Return to Run and Strength   []  Stage 3: Return to Jump and Strength   []  Stage 4: Dynamic Strength and Agility   []  Stage 5: Sport Specific Training     []  Ready to Return to Play, Meets All Above Stages   []  Not Ready for Return to Sports   Comments:                         Treatment/Activity Tolerance:  [x] Patient tolerated treatment well [] Patient limited by fatique  [] Patient limited by pain  [] Patient limited by other medical complications  [] Other:     Prognosis: [x] Good [] Fair  [] Poor    Patient Requires Follow-up: [x] Yes  [] No      PLAN: Continue manual, progress proximal hip strengthening as tolerated, dry needling as needed  [x] Continue per plan of care [] Alter current plan (see comments)  [] Plan of care initiated [] Hold pending MD visit [] Discharge    Electronically signed by: Carlos Ragland PT

## 2018-08-31 ENCOUNTER — HOSPITAL ENCOUNTER (OUTPATIENT)
Dept: PHYSICAL THERAPY | Age: 63
Discharge: HOME | End: 2018-09-01
Admitting: ORTHOPAEDIC SURGERY

## 2018-08-31 NOTE — FLOWSHEET NOTE
/ STM 0'  Right IT band, glutes, piriformis   Prone figure 4 mob 5'     Hip belt mobs 4'  Inferior, hip flexed to 110   Hip PROM/stretching 6'  Prone, supine   DN 15'  See below         NMR re-education - 12 min      [place]/Biofeedback 10/10      BFR      G. Med activation/sidelying      G. Max Activation/prone 10 10 Prone, table bent   Hip Ext full ROM G. Activation      Bosu Bal and Prop- G Med      Single leg stance/Balance/Prop 10 6 Modified with step, cues   Bosu Retro G. Med act      Prone Hip froggers- sliders/elevated 10 8 Prone, table bent-cues for lumbar compensation             Therapeutic Exercise and NMR EXR  [x] (74442) Provided verbal/tactile cueing for activities related to strengthening, flexibility, endurance, ROM for improvements in LE, proximal hip, and core control with self care, mobility, lifting, ambulation. [x] (26930) Provided verbal/tactile cueing for activities related to improving balance, coordination, kinesthetic sense, posture, motor skill, proprioception  to assist with LE, proximal hip, and core control in self care, mobility, lifting, ambulation and eccentric single leg control.      NMR and Therapeutic Activities:    [] (59317 or 18180) Provided verbal/tactile cueing for activities related to improving balance, coordination, kinesthetic sense, posture, motor skill, proprioception and motor activation to allow for proper function of core, proximal hip and LE with self care and ADLs  [] (08457) Gait Re-education- Provided training and instruction to the patient for proper LE, core and proximal hip recruitment and positioning and eccentric body weight control with ambulation re-education including up and down stairs     Home Exercise Program:    [x] (39285) Reviewed/Progressed HEP activities related to strengthening, flexibility, endurance, ROM of core, proximal hip and LE for functional self-care, mobility, lifting and ambulation/stair navigation   [] (23119)Reviewed/Progressed HEP EVAL (LOW) 09659 (typically 20 minutes face-to-face)  [] EVAL (MOD) 25002 (typically 30 minutes face-to-face)  [] EVAL (HIGH) 99252 (typically 45 minutes face-to-face)  [] RE-EVAL     [] FQ(96095) x      [] IONTO  [x] NMR (86857) x  1   [] VASO  [x] Manual (31217) x  2    [] Other:  [] TA x       [] Mech Traction (98551)  [x] ES(attended) (36717)      [] ES (un) (47090):     GOALS:  Patient stated goal: pain-free, return to PLOF    Therapist goals for Patient:   Short Term Goals: To be achieved in: 2 weeks  1. Independent in HEP and progression per patient tolerance, in order to prevent re-injury. 2. Patient will have a decrease in pain to facilitate improvement in movement, function, and ADLs as indicated by Functional Deficits. Long Term Goals: To be achieved in: 6 weeks  1. Disability index score of 18% or less for the LEFS to assist with reaching prior level of function. 2. Patient will demonstrate increased AROM to equal contralateral to allow for proper joint functioning as indicated by patients Functional Deficits. 3. Patient will demonstrate an increase in Strength to good proximal hip strength and control, within 5lb HHD in LE to allow for proper functional mobility as indicated by patients Functional Deficits. 4. Patient will return to prolonged standing, prolonged ambulation, squatting, stairs, kneeling, transfers functional activities without increased symptoms or restriction. 5. Patient will report ability to sleep throughout night without waking due to pain. Progression Towards Functional goals:  [x] Patient is progressing as expected towards functional goals listed. [] Progression is slowed due to complexities listed. [] Progression has been slowed due to co-morbidities. [] Plan just implemented, too soon to assess goals progression  [] Other:     ASSESSMENT:   Good tolerance to dry needling today, improvement in stiffness at conclusion.  Continued soft tissue restrictions in IT

## 2018-09-01 ENCOUNTER — HOSPITAL ENCOUNTER (OUTPATIENT)
Dept: OTHER | Age: 63
Discharge: HOME | End: 2018-09-01
Attending: ORTHOPAEDIC SURGERY | Admitting: ORTHOPAEDIC SURGERY

## 2018-09-07 ENCOUNTER — HOSPITAL ENCOUNTER (OUTPATIENT)
Dept: PHYSICAL THERAPY | Age: 63
Discharge: HOME | End: 2018-09-08
Admitting: ORTHOPAEDIC SURGERY

## 2018-09-07 NOTE — PLAN OF CARE
(67137)Reviewed/Progressed HEP activities related to improving balance, coordination, kinesthetic sense, posture, motor skill, proprioception of core, proximal hip and LE for self care, mobility, lifting, and ambulation/stair navigation      Manual Treatments:  PROM / STM / Oscillations-Mobs:  G-I, II, III, IV (PA's, Inf., Post.)  [x] (81721) Provided manual therapy to mobilize LE, proximal hip and/or LS spine soft tissue/joints for the purpose of modulating pain, promoting relaxation,  increasing ROM, reducing/eliminating soft tissue swelling/inflammation/restriction, improving soft tissue extensibility and allowing for proper ROM for normal function with self care, mobility, lifting and ambulation. Modalities:  None    Charges:  Timed Code Treatment Minutes: 44   Total Treatment Minutes: 44     [] EVAL (LOW) 41125 (typically 20 minutes face-to-face)  [] EVAL (MOD) 41636 (typically 30 minutes face-to-face)  [] EVAL (HIGH) 33078 (typically 45 minutes face-to-face)  [] RE-EVAL     [x] AM(45917) x  2   [] IONTO  [] NMR (63344) x      [] VASO  [x] Manual (79726) x  1    [] Other:  [] TA x       [] Mech Traction (75467)  [] ES(attended) (87475)      [] ES (un) (54162):     GOALS:  Patient stated goal: pain-free, return to PLOF    Therapist goals for Patient:   Short Term Goals: To be achieved in: 2 weeks  1. Independent in HEP and progression per patient tolerance, in order to prevent re-injury. - 100% MET  2. Patient will have a decrease in pain to facilitate improvement in movement, function, and ADLs as indicated by Functional Deficits. - 75% MET    Long Term Goals: To be achieved in: 6 weeks  1. Disability index score of 18% or less for the LEFS to assist with reaching prior level of function.  - NOT MET  2. Patient will demonstrate increased AROM to equal contralateral to allow for proper joint functioning as indicated by patients Functional Deficits. - 90% MET  3.  Patient will demonstrate an increase in Strength to good proximal hip strength and control, within 5lb HHD in LE to allow for proper functional mobility as indicated by patients Functional Deficits. - 75% MET  4. Patient will return to prolonged standing, prolonged ambulation, squatting, stairs, kneeling, transfers functional activities without increased symptoms or restriction. - 75% MET  5. Patient will report ability to sleep throughout night without waking due to pain. - 90% MET    Progression Towards Functional goals:  [x] Patient is progressing as expected towards functional goals listed. [] Progression is slowed due to complexities listed. [] Progression has been slowed due to co-morbidities. [] Plan just implemented, too soon to assess goals progression  [] Other:     ASSESSMENT:   Patient has attended 11 physical therapy visits from 8/1/18 through 9/7/18 for treatment of right hip trochanteric bursitis and gluteus medius tendinopathy. Patient is making great improvements in right hip mobility, though still some continued soft tissue restrictions in IT band, piriformis, and hip flexors. Needs improved glute med/max strength to improve pelvic stability in SLS and with squatting/lunging. Fatigues quickly with functional squatting exercises. Tends to activate hip flexors more than glutes with weight bearing exercises. Patient will benefit from continued physical therapy to address these impairments and improve overall function.     Return to Play: (if applicable)   []  Stage 1: Intro to Strength   []  Stage 2: Return to Run and Strength   []  Stage 3: Return to Jump and Strength   []  Stage 4: Dynamic Strength and Agility   []  Stage 5: Sport Specific Training     []  Ready to Return to Play, Meets All Above Stages   []  Not Ready for Return to Sports   Comments:                         Treatment/Activity Tolerance:  [x] Patient tolerated treatment well [] Patient limited by fatique  [] Patient limited by pain  [] Patient limited by other medical complications  [] Other:     Prognosis: [x] Good [] Fair  [] Poor    Patient Requires Follow-up: [x] Yes  [] No      PLAN: Continue 2x/week for 4-6 weeks for manual, progress proximal hip strengthening as tolerated, dry needling as needed  [x] Continue per plan of care [] Alter current plan (see comments)  [] Plan of care initiated [] Hold pending MD visit [] Discharge    Electronically signed by: Hamlet Bellamy PT

## 2018-09-10 ENCOUNTER — HOSPITAL ENCOUNTER (OUTPATIENT)
Dept: PHYSICAL THERAPY | Age: 63
Discharge: HOME | End: 2018-09-11
Admitting: ORTHOPAEDIC SURGERY

## 2018-09-10 NOTE — FLOWSHEET NOTE
[address] [place] [address] [place] [address]  Phone: [phone] Fax: [phone]          Date:  9/10/2018    Patient Name:  Forrest Joe    :  1955  MRN: [id number]  Restrictions/Precautions:    Medical/Treatment Diagnosis Information:  · Diagnosis: Right hip pain, glutes medius tendinopathy, trochanteric bursitis  · Treatment Diagnosis: M25.551, M67.98, V20.79  Insurance/Certification information:  PT Insurance Information: UMR  Physician Information:  Referring Practitioner: Madiha Care  Plan of care signed (Y/N):     Date of Patient follow up with Physician:     G-Code (if applicable):      Date G-Code Applied:  18       Progress Note: [x]  Yes  []  No  Next due by: Visit #10       Latex Allergy:  [x]NO      []YES  Preferred Language for Healthcare:   [x]English       []other:    Visit # Insurance Allowable   11 60     Pain level:  0/10     SUBJECTIVE:  Patient reports approximately 50% improvement overall since starting therapy. Has been feeling less tightness in the front of the hip since the dry needling at last session. Still feels difficulty with performing stairs, but is beginning to notice how much stronger her hip needs to be for this.      OBJECTIVE:    Observation: TFL / psoas / rectus tight/tender to palpation  Test measurements:        ROM LEFT RIGHT   HIP Flex WNL WNL   HIP Abd WNL WNL   HIP Ext WNL WNL   HIP IR WNL WNL   HIP ER WNL WNL   Knee ext Hyper Hyper   Knee Flex WNL WNL   Strength  LEFT RIGHT   HIP Flexors 5/5 4-/5   HIP Abductors 7.8 lb 6.1 lb   HIP Ext 4+/5 3+/5   Hip ER       Knee EXT (quad)       Knee Flex (HS)              RESTRICTIONS/PRECAUTIONS: None    Exercises/Interventions:     Therapeutic Ex - 34 min Sets/sec Reps Notes   Alter G      Bridge - DL 4 5 With bilat hip abd   [place] March   add   3 way SLR      Hip Ext /table 5 10 0# alt   BOSU fwd/side lunge 10 10 fwd   BOSU squat Leg Press  - SL 1 15 35#   Kneeling alt UE/LE 5 10 Airex   TRX squat - weight shift R 5s 15 Cues for squat positioning   Glute side walks 3 10 [place], above knees   RDL   Add? (DL)   Slide Lunge 2 10 Retro at counter   [place] 3 10 [place] at knees, airex   Step ups/ecc step down      [place] wall rolls- in SLS- hip drive   Add? Manual Intervention - 10 min      IASTM / STM 0'  Right IT band, glutes, piriformis   Prone figure 4 mob 5'     Hip belt mobs 4'  Inferior, hip flexed to 110   Hip PROM/stretching 6'  Prone, supine   DN 0'  Held today         NMR re-education - 0 min      [place]/Biofeedback 10/10      BFR      G. Med activation/sidelying      G. Max Activation/prone 10 10 Prone, table bent   Hip Ext full ROM G. Activation      Bosu Bal and Prop- G Med      Single leg stance/Balance/Prop 10 6 Modified with step, cues   Bosu Retro G. Med act      Prone Hip froggers- sliders/elevated 10 8 Prone, table bent-cues for lumbar compensation             Therapeutic Exercise and NMR EXR  [x] (86933) Provided verbal/tactile cueing for activities related to strengthening, flexibility, endurance, ROM for improvements in LE, proximal hip, and core control with self care, mobility, lifting, ambulation. [x] (83195) Provided verbal/tactile cueing for activities related to improving balance, coordination, kinesthetic sense, posture, motor skill, proprioception  to assist with LE, proximal hip, and core control in self care, mobility, lifting, ambulation and eccentric single leg control.      NMR and Therapeutic Activities:    [] (69253 or 69211) Provided verbal/tactile cueing for activities related to improving balance, coordination, kinesthetic sense, posture, motor skill, proprioception and motor activation to allow for proper function of core, proximal hip and LE with self care and ADLs  [] (09454) Gait Re-education- Provided training and instruction to the patient for proper LE, core and proximal hip recruitment and positioning and eccentric body weight control with ambulation re-education including up and down stairs     Home Exercise Program:    [x] (76290) Reviewed/Progressed HEP activities related to strengthening, flexibility, endurance, ROM of core, proximal hip and LE for functional self-care, mobility, lifting and ambulation/stair navigation   [] (91097)Reviewed/Progressed HEP activities related to improving balance, coordination, kinesthetic sense, posture, motor skill, proprioception of core, proximal hip and LE for self care, mobility, lifting, and ambulation/stair navigation      Manual Treatments:  PROM / STM / Oscillations-Mobs:  G-I, II, III, IV (PA's, Inf., Post.)  [x] (59542) Provided manual therapy to mobilize LE, proximal hip and/or LS spine soft tissue/joints for the purpose of modulating pain, promoting relaxation,  increasing ROM, reducing/eliminating soft tissue swelling/inflammation/restriction, improving soft tissue extensibility and allowing for proper ROM for normal function with self care, mobility, lifting and ambulation. Modalities:  None    Charges:  Timed Code Treatment Minutes: 44   Total Treatment Minutes: 44     [] EVAL (LOW) 36629 (typically 20 minutes face-to-face)  [] EVAL (MOD) 19078 (typically 30 minutes face-to-face)  [] EVAL (HIGH) 71225 (typically 45 minutes face-to-face)  [] RE-EVAL     [x] SE(01899) x  2   [] IONTO  [] NMR (49294) x      [] VASO  [x] Manual (98627) x  1    [] Other:  [] TA x       [] Mech Traction (68498)  [] ES(attended) (17124)      [] ES (un) (63455):     GOALS:  Patient stated goal: pain-free, return to PLOF    Therapist goals for Patient:   Short Term Goals: To be achieved in: 2 weeks  1. Independent in HEP and progression per patient tolerance, in order to prevent re-injury. - 100% MET  2. Patient will have a decrease in pain to facilitate improvement in movement, function, and ADLs as indicated by Functional Deficits.  - 75% MET    Long Term Goals: To be achieved in: 6 weeks  1. Disability index score of 18% or less for the LEFS to assist with reaching prior level of function.  - NOT MET  2. Patient will demonstrate increased AROM to equal contralateral to allow for proper joint functioning as indicated by patients Functional Deficits. - 90% MET  3. Patient will demonstrate an increase in Strength to good proximal hip strength and control, within 5lb HHD in LE to allow for proper functional mobility as indicated by patients Functional Deficits. - 75% MET  4. Patient will return to prolonged standing, prolonged ambulation, squatting, stairs, kneeling, transfers functional activities without increased symptoms or restriction. - 75% MET  5. Patient will report ability to sleep throughout night without waking due to pain. - 90% MET    Progression Towards Functional goals:  [x] Patient is progressing as expected towards functional goals listed. [] Progression is slowed due to complexities listed. [] Progression has been slowed due to co-morbidities. [] Plan just implemented, too soon to assess goals progression  [] Other:     ASSESSMENT:  Patient is making great improvements in right hip mobility, though still some continued soft tissue restrictions in IT band, piriformis, and hip flexors. Needs improved glute med/max strength to improve pelvic stability in SLS and with squatting/lunging. Fatigues quickly with functional squatting exercises. Tends to activate hip flexors more than glutes with weight bearing exercises. Patient will benefit from continued physical therapy to address these impairments and improve overall function.     Return to Play: (if applicable)   []  Stage 1: Intro to Strength   []  Stage 2: Return to Run and Strength   []  Stage 3: Return to Jump and Strength   []  Stage 4: Dynamic Strength and Agility   []  Stage 5: Sport Specific Training     []  Ready to Return to Play, Meets All Above Stages   []  Not Ready for Return to Sports   Comments:                         Treatment/Activity Tolerance:  [x] Patient tolerated treatment well [] Patient limited by fatique  [] Patient limited by pain  [] Patient limited by other medical complications  [] Other:     Prognosis: [x] Good [] Fair  [] Poor    Patient Requires Follow-up: [x] Yes  [] No      PLAN: Continue 2x/week for 4-6 weeks for manual, progress proximal hip strengthening as tolerated, dry needling as needed  [x] Continue per plan of care [] Alter current plan (see comments)  [] Plan of care initiated [] Hold pending MD visit [] Discharge    Electronically signed by: Soha Blum PTA

## 2018-09-12 ENCOUNTER — HOSPITAL ENCOUNTER (OUTPATIENT)
Dept: PHYSICAL THERAPY | Age: 63
Discharge: HOME | End: 2018-09-13
Admitting: ORTHOPAEDIC SURGERY

## 2018-09-21 ENCOUNTER — HOSPITAL ENCOUNTER (OUTPATIENT)
Dept: PHYSICAL THERAPY | Age: 63
Discharge: HOME | End: 2018-09-22
Admitting: ORTHOPAEDIC SURGERY

## 2018-09-21 NOTE — FLOWSHEET NOTE
with ambulation re-education including up and down stairs     Home Exercise Program:    [x] (17472) Reviewed/Progressed HEP activities related to strengthening, flexibility, endurance, ROM of core, proximal hip and LE for functional self-care, mobility, lifting and ambulation/stair navigation   [] (94988)Reviewed/Progressed HEP activities related to improving balance, coordination, kinesthetic sense, posture, motor skill, proprioception of core, proximal hip and LE for self care, mobility, lifting, and ambulation/stair navigation      Manual Treatments:  PROM / STM / Oscillations-Mobs:  G-I, II, III, IV (PA's, Inf., Post.)  [x] (86771) Provided manual therapy to mobilize LE, proximal hip and/or LS spine soft tissue/joints for the purpose of modulating pain, promoting relaxation,  increasing ROM, reducing/eliminating soft tissue swelling/inflammation/restriction, improving soft tissue extensibility and allowing for proper ROM for normal function with self care, mobility, lifting and ambulation. Spoke with   regarding the use of Dry Needling     Dry needling manual therapy: consisted on the placement of 6 needles in the following muscles:  right glute med, glute min, TFL, IT band. A 50 mm needle was inserted, piston, rotated, and coned to produce intramuscular mobilization. These techniques were used to restore functional range of motion, reduce muscle spasm and induce healing in the corresponding musculature. (28602)  Clean Technique was utilized today while applying Dry needling treatment. The treatment sites where cleaned with 70% solution of  isopropyl alcohol . The PT washed their hands and utilized treatment gloves along with hand  prior to inserting the needles. All needles where removed and discarded in the appropriate sharps container. MD has given verbal and/or written approval for this treatment.      Attended low frequency (1-20Hz) electrical stimulation was utilized in conjunction with Dry Needling:  the Estim was manipulated between all above needles for a period of 12 min. at 4-5 volts. The low frequency electrical stimulation was used to help reduce muscle spasm and help to interrupt /[place] the pain cycle. (84825)       Modalities:  None    Charges:  Timed Code Treatment Minutes: 29   Total Treatment Minutes: 41     [] EVAL (LOW) 58726 (typically 20 minutes face-to-face)  [] EVAL (MOD) 69700 (typically 30 minutes face-to-face)  [] EVAL (HIGH) 91650 (typically 45 minutes face-to-face)  [] RE-EVAL     [] XI(94127) x      [] IONTO  [] NMR (29561) x      [] VASO  [x] Manual (80591) x  2    [] Other:  [] TA x       [] Mech Traction (36810)  [x] ES(attended) (92270)      [] ES (un) (83824):     GOALS:  Patient stated goal: pain-free, return to PLOF    Therapist goals for Patient:   Short Term Goals: To be achieved in: 2 weeks  1. Independent in HEP and progression per patient tolerance, in order to prevent re-injury. - 100% MET  2. Patient will have a decrease in pain to facilitate improvement in movement, function, and ADLs as indicated by Functional Deficits. - 75% MET    Long Term Goals: To be achieved in: 6 weeks  1. Disability index score of 18% or less for the LEFS to assist with reaching prior level of function.  - NOT MET  2. Patient will demonstrate increased AROM to equal contralateral to allow for proper joint functioning as indicated by patients Functional Deficits. - 90% MET  3. Patient will demonstrate an increase in Strength to good proximal hip strength and control, within 5lb HHD in LE to allow for proper functional mobility as indicated by patients Functional Deficits. - 75% MET  4. Patient will return to prolonged standing, prolonged ambulation, squatting, stairs, kneeling, transfers functional activities without increased symptoms or restriction. - 75% MET  5. Patient will report ability to sleep throughout night without waking due to pain.  - 90% MET    Progression Towards Functional goals:  [x] Patient is progressing as expected towards functional goals listed. [] Progression is slowed due to complexities listed. [] Progression has been slowed due to co-morbidities. [] Plan just implemented, too soon to assess goals progression  [] Other:     ASSESSMENT:  Good tolerance to dry needling today, reported improvement in symptoms at conclusion. Continued soft tissue restrictions noted in TFL/IT band, glutes. Needs improved glute med/max strength to improve pelvic stability in SLS and with squatting/lunging. Needs frequent cueing for help recruiting posterior chain. Encouraged patient to avoid using RLE when ascending stairs to see if this will help reduce hip/knee irritation.     Return to Play: (if applicable)   []  Stage 1: Intro to Strength   []  Stage 2: Return to Run and Strength   []  Stage 3: Return to Jump and Strength   []  Stage 4: Dynamic Strength and Agility   []  Stage 5: Sport Specific Training     []  Ready to Return to Play, Meets All Above Stages   []  Not Ready for Return to Sports   Comments:                         Treatment/Activity Tolerance:  [x] Patient tolerated treatment well [] Patient limited by fatique  [] Patient limited by pain  [] Patient limited by other medical complications  [] Other:     Prognosis: [x] Good [] Fair  [] Poor    Patient Requires Follow-up: [x] Yes  [] No      PLAN: Manual, progress proximal hip strengthening as tolerated, dry needling as needed  [x] Continue per plan of care [] Alter current plan (see comments)  [] Plan of care initiated [] Hold pending MD visit [] Discharge    Electronically signed by: Michelle Carbajal PT

## 2018-09-24 ENCOUNTER — HOSPITAL ENCOUNTER (OUTPATIENT)
Dept: PHYSICAL THERAPY | Age: 63
Setting detail: THERAPIES SERIES
Discharge: HOME | End: 2018-09-24
Admitting: ORTHOPAEDIC SURGERY
Payer: COMMERCIAL

## 2018-09-24 PROCEDURE — 97140 MANUAL THERAPY 1/> REGIONS: CPT

## 2018-09-24 PROCEDURE — 97110 THERAPEUTIC EXERCISES: CPT

## 2018-09-24 NOTE — FLOWSHEET NOTE
Functional Deficits. - 90% MET  3. Patient will demonstrate an increase in Strength to good proximal hip strength and control, within 5lb HHD in LE to allow for proper functional mobility as indicated by patients Functional Deficits. - 75% MET  4. Patient will return to prolonged standing, prolonged ambulation, squatting, stairs, kneeling, transfers functional activities without increased symptoms or restriction. - 75% MET  5. Patient will report ability to sleep throughout night without waking due to pain. - 90% MET    Progression Towards Functional goals:  [x] Patient is progressing as expected towards functional goals listed. [] Progression is slowed due to complexities listed. [] Progression has been slowed due to co-morbidities. [] Plan just implemented, too soon to assess goals progression  [] Other:     ASSESSMENT:  Improvement in hip symptoms since needling at last session, still tight in distal IT band which contributes to her lateral knee pain, may need to address with another needling session. Needs improved glute med/max strength to improve pelvic stability in SLS and with squatting/lunging. Needs frequent cueing for help recruiting posterior chain and avoid knee valgus. Unable to progress to unilateral stance strengthening at this time due to pain.     Return to Play: (if applicable)   []  Stage 1: Intro to Strength   []  Stage 2: Return to Run and Strength   []  Stage 3: Return to Jump and Strength   []  Stage 4: Dynamic Strength and Agility   []  Stage 5: Sport Specific Training     []  Ready to Return to Play, Meets All Above Stages   []  Not Ready for Return to Sports   Comments:                         Treatment/Activity Tolerance:  [x] Patient tolerated treatment well [] Patient limited by fatique  [] Patient limited by pain  [] Patient limited by other medical complications  [] Other:     Prognosis: [x] Good [] Fair  [] Poor    Patient Requires Follow-up: [x] Yes  [] No      PLAN: Manual, progress proximal hip strengthening as tolerated, dry needling as needed  [x] Continue per plan of care [] Alter current plan (see comments)  [] Plan of care initiated [] Hold pending MD visit [] Discharge    Electronically signed by: Michelle Carbajal, PT

## 2018-09-28 ENCOUNTER — HOSPITAL ENCOUNTER (OUTPATIENT)
Dept: PHYSICAL THERAPY | Age: 63
Setting detail: THERAPIES SERIES
Discharge: HOME | End: 2018-09-28
Admitting: ORTHOPAEDIC SURGERY
Payer: COMMERCIAL

## 2018-09-28 PROCEDURE — 97032 APPL MODALITY 1+ESTIM EA 15: CPT

## 2018-09-28 PROCEDURE — 97140 MANUAL THERAPY 1/> REGIONS: CPT

## 2018-09-28 NOTE — FLOWSHEET NOTE
[address], [place] [address]  Phone: [phone] Fax: [phone]          Date:  2018    Patient Name:  Carol Land    :  1955  MRN: [id number]  Restrictions/Precautions:    Medical/Treatment Diagnosis Information:  · Diagnosis: Right hip pain, glutes medius tendinopathy, trochanteric bursitis  · Treatment Diagnosis: M25.551, M67.98, Z63.25  Insurance/Certification information:  PT Insurance Information: UMR  Physician Information:  Referring Practitioner: Tera John  Plan of care signed (Y/N):     Date of Patient follow up with Physician:     G-Code (if applicable):      Date G-Code Applied:  18       Progress Note: [x]  Yes  []  No  Next due by: Visit #20       Latex Allergy:  [x]NO      []YES  Preferred Language for Healthcare:   [x]English       []other:    Visit # Insurance Allowable   17 60     Pain level:  2/10 - lateral R knee     SUBJECTIVE:  Patient states that her hip has really been doing a lot better, but the lateral aspect of her right knee has still really been bothering her. She feels a sharp \"twinge\" in the knee often, especially when using the stairs, which she has been trying to avoid. Pain will even radiate down the anterior aspect of the lower leg, similar to shin splints. She would like to try dry needing closer to the knee to see if this will help.        OBJECTIVE:    Observation: TFL / psoas / rectus tight/tender to palpation  Test measurements:          RESTRICTIONS/PRECAUTIONS: None    Exercises/Interventions:     Therapeutic Ex - 0 min Sets/sec Reps Notes   Arman Chain - DL 4 5 With bilat hip abd   [place] March   add   [place] lunge      Chair sits - large plyo box + airex x 2 1 12 [place] TB, cues   BOSU squat   add   Leg Press  - ecc 1 15 35#   TRX squat - weight shift R 5s 15 Cues for squat positioning   Glute side walks 3 10 Aqua, below knees   RDL - DL 1 12 flexibility, endurance, ROM of core, proximal hip and LE for functional self-care, mobility, lifting and ambulation/stair navigation   [] (72850)Reviewed/Progressed HEP activities related to improving balance, coordination, kinesthetic sense, posture, motor skill, proprioception of core, proximal hip and LE for self care, mobility, lifting, and ambulation/stair navigation      Manual Treatments:  PROM / STM / Oscillations-Mobs:  G-I, II, III, IV (PA's, Inf., Post.)  [x] (34704) Provided manual therapy to mobilize LE, proximal hip and/or LS spine soft tissue/joints for the purpose of modulating pain, promoting relaxation,  increasing ROM, reducing/eliminating soft tissue swelling/inflammation/restriction, improving soft tissue extensibility and allowing for proper ROM for normal function with self care, mobility, lifting and ambulation. Spoke with   regarding the use of Dry Needling     Dry needling manual therapy: consisted on the placement of 4 needles in the following muscles:  right distal IT band, anterior tibialis. A 40 mm needle was inserted, piston, rotated, and coned to produce intramuscular mobilization. These techniques were used to restore functional range of motion, reduce muscle spasm and induce healing in the corresponding musculature. (80105)  Clean Technique was utilized today while applying Dry needling treatment. The treatment sites where cleaned with 70% solution of  isopropyl alcohol . The PT washed their hands and utilized treatment gloves along with hand  prior to inserting the needles. All needles where removed and discarded in the appropriate sharps container. MD has given verbal and/or written approval for this treatment. Attended low frequency (1-20Hz) electrical stimulation was utilized in conjunction with Dry Needling:  the Estim was manipulated between all above needles for a period of 12 min. at 4-5 volts.   The low frequency electrical stimulation was used to help reduce muscle spasm and help to interrupt /[place] the pain cycle. (62188)       Modalities:  None    Charges:  Timed Code Treatment Minutes: 26   Total Treatment Minutes: 38     [] EVAL (LOW) 58728 (typically 20 minutes face-to-face)  [] EVAL (MOD) 18237 (typically 30 minutes face-to-face)  [] EVAL (HIGH) 53982 (typically 45 minutes face-to-face)  [] RE-EVAL     [] KK(01188) x      [] IONTO  [] NMR (34150) x      [] VASO  [x] Manual (02585) x  2    [] Other:  [] TA x       [] Mech Traction (36060)  [x] ES(attended) (29943)      [] ES (un) (48663):     GOALS:  Patient stated goal: pain-free, return to PLOF    Therapist goals for Patient:   Short Term Goals: To be achieved in: 2 weeks  1. Independent in HEP and progression per patient tolerance, in order to prevent re-injury. - 100% MET  2. Patient will have a decrease in pain to facilitate improvement in movement, function, and ADLs as indicated by Functional Deficits. - 75% MET    Long Term Goals: To be achieved in: 6 weeks  1. Disability index score of 18% or less for the LEFS to assist with reaching prior level of function.  - NOT MET  2. Patient will demonstrate increased AROM to equal contralateral to allow for proper joint functioning as indicated by patients Functional Deficits. - 90% MET  3. Patient will demonstrate an increase in Strength to good proximal hip strength and control, within 5lb HHD in LE to allow for proper functional mobility as indicated by patients Functional Deficits. - 75% MET  4. Patient will return to prolonged standing, prolonged ambulation, squatting, stairs, kneeling, transfers functional activities without increased symptoms or restriction. - 75% MET  5. Patient will report ability to sleep throughout night without waking due to pain. - 90% MET    Progression Towards Functional goals:  [x] Patient is progressing as expected towards functional goals listed. [] Progression is slowed due to complexities listed.   [] Progression

## 2018-10-01 ENCOUNTER — HOSPITAL ENCOUNTER (OUTPATIENT)
Dept: PHYSICAL THERAPY | Age: 63
Setting detail: THERAPIES SERIES
Discharge: HOME | End: 2018-10-01
Admitting: ORTHOPAEDIC SURGERY
Payer: COMMERCIAL

## 2018-10-01 PROCEDURE — 97140 MANUAL THERAPY 1/> REGIONS: CPT

## 2018-10-01 PROCEDURE — 97110 THERAPEUTIC EXERCISES: CPT

## 2018-10-05 ENCOUNTER — HOSPITAL ENCOUNTER (OUTPATIENT)
Dept: PHYSICAL THERAPY | Age: 63
Setting detail: THERAPIES SERIES
Discharge: HOME | End: 2018-10-05
Admitting: ORTHOPAEDIC SURGERY
Payer: COMMERCIAL

## 2018-10-05 PROCEDURE — 97110 THERAPEUTIC EXERCISES: CPT

## 2018-10-05 PROCEDURE — 97140 MANUAL THERAPY 1/> REGIONS: CPT

## 2018-10-05 NOTE — FLOWSHEET NOTE
in SLS- hip drive 10 5 Each, no SB   Kneeling hip flexor stretch 30 3 each         Manual Intervention - 10 min      IASTM / STM 10'  Right anterior tibialis   Prone figure 4 mob 0'     Hip belt mobs 0'  Inferior, hip flexed to 110   Hip PROM/stretching 0'  Prone, supine   DN 0'  Held today         NMR re-education - 0 min      [place]ian/Biofeedback 10/10      BFR      G. Med activation/sidelying      G. Max Activation/prone 10 10 Prone, table bent   Hip Ext full ROM G. Activation      Bosu Bal and Prop- G Med      Single leg stance/Balance/Prop 10 6 Modified with step, cues   Bosu Retro G. Med act      Prone Hip froggers- sliders/elevated 10 8 Prone, table bent-cues for lumbar compensation             Therapeutic Exercise and NMR EXR  [x] (41097) Provided verbal/tactile cueing for activities related to strengthening, flexibility, endurance, ROM for improvements in LE, proximal hip, and core control with self care, mobility, lifting, ambulation. [x] (03791) Provided verbal/tactile cueing for activities related to improving balance, coordination, kinesthetic sense, posture, motor skill, proprioception  to assist with LE, proximal hip, and core control in self care, mobility, lifting, ambulation and eccentric single leg control.      NMR and Therapeutic Activities:    [] (10662 or 26801) Provided verbal/tactile cueing for activities related to improving balance, coordination, kinesthetic sense, posture, motor skill, proprioception and motor activation to allow for proper function of core, proximal hip and LE with self care and ADLs  [] (19560) Gait Re-education- Provided training and instruction to the patient for proper LE, core and proximal hip recruitment and positioning and eccentric body weight control with ambulation re-education including up and down stairs     Home Exercise Program:    [x] (96330) Reviewed/Progressed HEP activities related to strengthening, flexibility, endurance, ROM of core, proximal hip and

## 2018-10-08 ENCOUNTER — HOSPITAL ENCOUNTER (OUTPATIENT)
Dept: PHYSICAL THERAPY | Age: 63
Setting detail: THERAPIES SERIES
Discharge: HOME | End: 2018-10-08
Admitting: ORTHOPAEDIC SURGERY
Payer: COMMERCIAL

## 2018-10-08 PROCEDURE — 97140 MANUAL THERAPY 1/> REGIONS: CPT

## 2018-10-08 PROCEDURE — G8978 MOBILITY CURRENT STATUS: HCPCS

## 2018-10-08 PROCEDURE — G8979 MOBILITY GOAL STATUS: HCPCS

## 2018-10-08 PROCEDURE — 97110 THERAPEUTIC EXERCISES: CPT

## 2018-10-08 NOTE — PLAN OF CARE
ambulation re-education including up and down stairs     Home Exercise Program:    [x] (56501) Reviewed/Progressed HEP activities related to strengthening, flexibility, endurance, ROM of core, proximal hip and LE for functional self-care, mobility, lifting and ambulation/stair navigation   [] (83987)Reviewed/Progressed HEP activities related to improving balance, coordination, kinesthetic sense, posture, motor skill, proprioception of core, proximal hip and LE for self care, mobility, lifting, and ambulation/stair navigation      Manual Treatments:  PROM / STM / Oscillations-Mobs:  G-I, II, III, IV (PA's, Inf., Post.)  [x] (31012) Provided manual therapy to mobilize LE, proximal hip and/or LS spine soft tissue/joints for the purpose of modulating pain, promoting relaxation,  increasing ROM, reducing/eliminating soft tissue swelling/inflammation/restriction, improving soft tissue extensibility and allowing for proper ROM for normal function with self care, mobility, lifting and ambulation. Modalities:  None    Charges:  Timed Code Treatment Minutes: 46   Total Treatment Minutes: 46     [] EVAL (LOW) 65865 (typically 20 minutes face-to-face)  [] EVAL (MOD) 33656 (typically 30 minutes face-to-face)  [] EVAL (HIGH) 30743 (typically 45 minutes face-to-face)  [] RE-EVAL     [x] KB(72659) x  2   [] IONTO  [] NMR (03167) x      [] VASO  [x] Manual (00791) x  1    [] Other:  [] TA x       [] Mech Traction (04882)  [] ES(attended) (56239)      [] ES (un) (57679):     GOALS:  Patient stated goal: pain-free, return to PLOF    Therapist goals for Patient:   Short Term Goals: To be achieved in: 2 weeks  1. Independent in HEP and progression per patient tolerance, in order to prevent re-injury. - 100% MET  2. Patient will have a decrease in pain to facilitate improvement in movement, function, and ADLs as indicated by Functional Deficits. - 75% MET    Long Term Goals: To be achieved in: 6 weeks  1.  Disability index score and Strength   []  Stage 4: Dynamic Strength and Agility   []  Stage 5: Sport Specific Training     []  Ready to Return to Play, Meets All Above Stages   []  Not Ready for Return to Sports   Comments:                         Treatment/Activity Tolerance:  [x] Patient tolerated treatment well [] Patient limited by fatique  [] Patient limited by pain  [] Patient limited by other medical complications  [] Other:     Prognosis: [x] Good [] Fair  [] Poor    Patient Requires Follow-up: [x] Yes  [] No      PLAN: Continue 2x/week for 4-6 weeks for manual, progress proximal hip strengthening as tolerated, dry needling/IASTM as needed  [x] Continue per plan of care [] Alter current plan (see comments)  [] Plan of care initiated [] Hold pending MD visit [] Discharge    Electronically signed by: Timur Donald PT

## 2018-10-12 ENCOUNTER — HOSPITAL ENCOUNTER (OUTPATIENT)
Dept: PHYSICAL THERAPY | Age: 63
Setting detail: THERAPIES SERIES
Discharge: HOME | End: 2018-10-12
Admitting: ORTHOPAEDIC SURGERY
Payer: COMMERCIAL

## 2018-10-12 PROCEDURE — 97140 MANUAL THERAPY 1/> REGIONS: CPT

## 2018-10-12 PROCEDURE — 97110 THERAPEUTIC EXERCISES: CPT

## 2018-10-12 NOTE — FLOWSHEET NOTE
15 Cues for squat positioning   Glute side walks 2 10 orange, below knees   RDL - DL 1 12 Yellow KB   Steamboats 3 10 Aqua at knees, airex   Step ups/ecc step down      [place] wall rolls- in SLS- hip drive 10 5 Each, no SB   Kneeling hip flexor stretch 30 3 each         Manual Intervention - 12 min      IASTM / STM 12'  Right anterior tibialis, IT band   Prone figure 4 mob 0'     Hip belt mobs 0'  Inferior, hip flexed to 110   Hip PROM/stretching 0'  Prone, supine   DN 0'  Held today         NMR re-education - 0 min      [place]/Biofeedback 10/10      BFR      G. Med activation/sidelying      G. Max Activation/prone 10 10 Prone, table bent   Hip Ext full ROM G. Activation      Bosu Bal and Prop- G Med      Single leg stance/Balance/Prop 10 6 Modified with step, cues   Bosu Retro G. Med act      Prone Hip froggers- sliders/elevated 10 8 Prone, table bent-cues for lumbar compensation                 Therapeutic Exercise and NMR EXR  [x] (81168) Provided verbal/tactile cueing for activities related to strengthening, flexibility, endurance, ROM for improvements in LE, proximal hip, and core control with self care, mobility, lifting, ambulation. [x] (89522) Provided verbal/tactile cueing for activities related to improving balance, coordination, kinesthetic sense, posture, motor skill, proprioception  to assist with LE, proximal hip, and core control in self care, mobility, lifting, ambulation and eccentric single leg control.      NMR and Therapeutic Activities:    [] (57608 or 86398) Provided verbal/tactile cueing for activities related to improving balance, coordination, kinesthetic sense, posture, motor skill, proprioception and motor activation to allow for proper function of core, proximal hip and LE with self care and ADLs  [] (54877) Gait Re-education- Provided training and instruction to the patient for proper LE, core and proximal hip recruitment and positioning and eccentric body weight control with tolerated treatment well [] Patient limited by fatique  [] Patient limited by pain  [] Patient limited by other medical complications  [] Other:     Prognosis: [x] Good [] Fair  [] Poor    Patient Requires Follow-up: [x] Yes  [] No      PLAN: Manual, progress proximal hip strengthening as tolerated, dry needling/IASTM as needed  [x] Continue per plan of care [] Alter current plan (see comments)  [] Plan of care initiated [] Hold pending MD visit [] Discharge    Electronically signed by: Arelis Jimenes PT

## 2018-10-15 ENCOUNTER — HOSPITAL ENCOUNTER (OUTPATIENT)
Dept: PHYSICAL THERAPY | Age: 63
Setting detail: THERAPIES SERIES
Discharge: HOME | End: 2018-10-15
Admitting: ORTHOPAEDIC SURGERY
Payer: COMMERCIAL

## 2018-10-15 PROCEDURE — 97140 MANUAL THERAPY 1/> REGIONS: CPT

## 2018-10-15 PROCEDURE — 97110 THERAPEUTIC EXERCISES: CPT

## 2018-10-15 NOTE — FLOWSHEET NOTE
mobility, lifting and ambulation/stair navigation   [] (90233)Reviewed/Progressed HEP activities related to improving balance, coordination, kinesthetic sense, posture, motor skill, proprioception of core, proximal hip and LE for self care, mobility, lifting, and ambulation/stair navigation      Manual Treatments:  PROM / STM / Oscillations-Mobs:  G-I, II, III, IV (PA's, Inf., Post.)  [x] (75272) Provided manual therapy to mobilize LE, proximal hip and/or LS spine soft tissue/joints for the purpose of modulating pain, promoting relaxation,  increasing ROM, reducing/eliminating soft tissue swelling/inflammation/restriction, improving soft tissue extensibility and allowing for proper ROM for normal function with self care, mobility, lifting and ambulation. Modalities:  None    Charges:  Timed Code Treatment Minutes: 44   Total Treatment Minutes: 44     [] EVAL (LOW) 31711 (typically 20 minutes face-to-face)  [] EVAL (MOD) 54793 (typically 30 minutes face-to-face)  [] EVAL (HIGH) 29754 (typically 45 minutes face-to-face)  [] RE-EVAL     [x] NI(00700) x  2   [] IONTO  [] NMR (41505) x      [] VASO  [x] Manual (39381) x  1    [] Other:  [] TA x       [] Mech Traction (48461)  [] ES(attended) (20883)      [] ES (un) (34834):     GOALS:  Patient stated goal: pain-free, return to PLOF    Therapist goals for Patient:   Short Term Goals: To be achieved in: 2 weeks  1. Independent in HEP and progression per patient tolerance, in order to prevent re-injury. - 100% MET  2. Patient will have a decrease in pain to facilitate improvement in movement, function, and ADLs as indicated by Functional Deficits. - 75% MET    Long Term Goals: To be achieved in: 6 weeks  1. Disability index score of 18% or less for the LEFS to assist with reaching prior level of function. - 50% MET  2. Patient will demonstrate increased AROM to equal contralateral to allow for proper joint functioning as indicated by patients Functional Deficits.

## 2018-10-17 ENCOUNTER — HOSPITAL ENCOUNTER (OUTPATIENT)
Dept: PHYSICAL THERAPY | Age: 63
Setting detail: THERAPIES SERIES
Discharge: HOME | End: 2018-10-17
Admitting: ORTHOPAEDIC SURGERY
Payer: COMMERCIAL

## 2018-10-17 PROCEDURE — 97110 THERAPEUTIC EXERCISES: CPT

## 2018-10-17 PROCEDURE — 97140 MANUAL THERAPY 1/> REGIONS: CPT

## 2018-10-17 NOTE — FLOWSHEET NOTE
navigation   [] (48416)Reviewed/Progressed HEP activities related to improving balance, coordination, kinesthetic sense, posture, motor skill, proprioception of core, proximal hip and LE for self care, mobility, lifting, and ambulation/stair navigation      Manual Treatments:  PROM / STM / Oscillations-Mobs:  G-I, II, III, IV (PA's, Inf., Post.)  [x] (12727) Provided manual therapy to mobilize LE, proximal hip and/or LS spine soft tissue/joints for the purpose of modulating pain, promoting relaxation,  increasing ROM, reducing/eliminating soft tissue swelling/inflammation/restriction, improving soft tissue extensibility and allowing for proper ROM for normal function with self care, mobility, lifting and ambulation. Modalities:  None    Charges:  Timed Code Treatment Minutes: 45   Total Treatment Minutes: 45     [] EVAL (LOW) 85843 (typically 20 minutes face-to-face)  [] EVAL (MOD) 36820 (typically 30 minutes face-to-face)  [] EVAL (HIGH) 94020 (typically 45 minutes face-to-face)  [] RE-EVAL     [x] VF(86611) x  2   [] IONTO  [] NMR (77668) x      [] VASO  [x] Manual (75290) x  1    [] Other:  [] TA x       [] Mech Traction (85376)  [] ES(attended) (24988)      [] ES (un) (85403):     GOALS:  Patient stated goal: pain-free, return to PLOF    Therapist goals for Patient:   Short Term Goals: To be achieved in: 2 weeks  1. Independent in HEP and progression per patient tolerance, in order to prevent re-injury. - 100% MET  2. Patient will have a decrease in pain to facilitate improvement in movement, function, and ADLs as indicated by Functional Deficits. - 75% MET    Long Term Goals: To be achieved in: 6 weeks  1. Disability index score of 18% or less for the LEFS to assist with reaching prior level of function. - 50% MET  2. Patient will demonstrate increased AROM to equal contralateral to allow for proper joint functioning as indicated by patients Functional Deficits. - 90% MET  3.  Patient will demonstrate

## 2018-10-22 ENCOUNTER — HOSPITAL ENCOUNTER (OUTPATIENT)
Dept: PHYSICAL THERAPY | Age: 63
Setting detail: THERAPIES SERIES
Discharge: HOME | End: 2018-10-22
Admitting: ORTHOPAEDIC SURGERY
Payer: COMMERCIAL

## 2018-10-22 PROCEDURE — 97140 MANUAL THERAPY 1/> REGIONS: CPT

## 2018-10-22 PROCEDURE — 97032 APPL MODALITY 1+ESTIM EA 15: CPT

## 2018-10-22 NOTE — FLOWSHEET NOTE
[place] [address] [facility] [address]  Phone: [phone] Fax: [phone]        Physical Therapy Daily Treatment Note        Date:  10/22/2018    Patient Name:  Popeye Woo    :  1955  MRN: [id number]  Restrictions/Precautions:    Medical/Treatment Diagnosis Information:  · Diagnosis: Right hip pain, glutes medius tendinopathy, trochanteric bursitis  · Treatment Diagnosis: M25.551, M67.98, D06.98  Insurance/Certification information:  PT Insurance Information: UMR  Physician Information:  Referring Practitioner: Javy Ahmadi  Plan of care signed (Y/N):     Date of Patient follow up with Physician:     G-Code (if applicable):      Date G-Code Applied:  10/8/18       Progress Note: [x]  Yes  []  No  Next due by: Visit #10       Latex Allergy:  [x]NO      []YES  Preferred Language for Healthcare:   [x]English       []other:    Visit # Insurance Allowable   23 60     Pain level:  3/10     SUBJECTIVE:  Patient states that her right lateral thigh and anterior hip started bothering her a little over the weekend. She tried to minimize her stair climbing but did do a little over the weekend. She also did a good deal of standing while preparing food for a dinner party. Feels that it might be a good idea to do dry needling today.     OBJECTIVE:    Observation: TFL / psoas / rectus tight/tender to palpation  Test measurements:        RESTRICTIONS/PRECAUTIONS: None    Exercises/Interventions:     Therapeutic Ex - 0 min Sets/sec Reps Notes   Denzel lungleopoldo 1 12 UE support, small red step   BOSU squat 5 10 Wall for support, orange band   Leg Press - SL 3 10 30#   Leg Press  - DL 2 10 80#, orange band   TRX squat - weight shift R 5s 15 Cues for squat positioning   Glute side walks 2 10 orange, below knees   RDL - DL 1 12 Yellow KB   Steamboats 3 10 Aqua at knees, airex   Step ups/ecc step down      Swissball wall rolls- in SLS- hip drive

## 2018-10-26 ENCOUNTER — HOSPITAL ENCOUNTER (OUTPATIENT)
Dept: PHYSICAL THERAPY | Age: 63
Setting detail: THERAPIES SERIES
Discharge: HOME | End: 2018-10-26
Admitting: ORTHOPAEDIC SURGERY
Payer: COMMERCIAL

## 2018-10-26 PROCEDURE — 97140 MANUAL THERAPY 1/> REGIONS: CPT

## 2018-10-26 PROCEDURE — 97110 THERAPEUTIC EXERCISES: CPT

## 2018-10-26 NOTE — FLOWSHEET NOTE
airex   Step ups/ecc step down      [place] wall rolls- in SLS- hip drive 10 5 Each, no SB         Manual Intervention - 15 min      IASTM / STM 6'  Right anterior tibialis, IT band   Prone figure 4 mob 4'     Hip belt mobs 0'  Inferior, hip flexed to 110   Hip PROM/stretching 5'  Prone, supine   DN 0'  Held today         NMR re-education - 0 min      [place]/Biofeedback 10/10      BFR      G. Med activation/sidelying      G. Max Activation/prone 10 10 Prone, table bent   Hip Ext full ROM G. Activation      Bosu Bal and Prop- G Med      Single leg stance/Balance/Prop 10 6 Modified with step, cues   Bosu Retro G. Med act      Prone Hip froggers- sliders/elevated 10 8 Prone, table bent-cues for lumbar compensation                 Therapeutic Exercise and NMR EXR  [x] (24151) Provided verbal/tactile cueing for activities related to strengthening, flexibility, endurance, ROM for improvements in LE, proximal hip, and core control with self care, mobility, lifting, ambulation. [x] (06825) Provided verbal/tactile cueing for activities related to improving balance, coordination, kinesthetic sense, posture, motor skill, proprioception  to assist with LE, proximal hip, and core control in self care, mobility, lifting, ambulation and eccentric single leg control.      NMR and Therapeutic Activities:    [] (67645 or 18004) Provided verbal/tactile cueing for activities related to improving balance, coordination, kinesthetic sense, posture, motor skill, proprioception and motor activation to allow for proper function of core, proximal hip and LE with self care and ADLs  [] (23912) Gait Re-education- Provided training and instruction to the patient for proper LE, core and proximal hip recruitment and positioning and eccentric body weight control with ambulation re-education including up and down stairs     Home Exercise Program:    [x] (61453) Reviewed/Progressed HEP activities related to strengthening, flexibility, endurance,

## 2018-10-29 ENCOUNTER — HOSPITAL ENCOUNTER (OUTPATIENT)
Dept: PHYSICAL THERAPY | Age: 63
Setting detail: THERAPIES SERIES
Discharge: HOME | End: 2018-10-29
Admitting: ORTHOPAEDIC SURGERY
Payer: COMMERCIAL

## 2018-10-29 PROCEDURE — 97110 THERAPEUTIC EXERCISES: CPT

## 2018-10-29 PROCEDURE — 97140 MANUAL THERAPY 1/> REGIONS: CPT

## 2018-10-29 NOTE — FLOWSHEET NOTE
[facility] [address] [address]  Phone: [phone] Fax: [phone]        Physical Therapy Daily Treatment Note        Date:  10/29/2018    Patient Name:  Lior Butler    :  1955  MRN: [id number]  Restrictions/Precautions:    Medical/Treatment Diagnosis Information:  · Diagnosis: Right hip pain, glutes medius tendinopathy, trochanteric bursitis  · Treatment Diagnosis: M25.551, M67.98, K25.41  Insurance/Certification information:  PT Insurance Information: UMR  Physician Information:  Referring Practitioner: Poornima Briscoe  Plan of care signed (Y/N):     Date of Patient follow up with Physician:     G-Code (if applicable):      Date G-Code Applied:  10/8/18       Progress Note: [x]  Yes  []  No  Next due by: Visit #30       Latex Allergy:  [x]NO      []YES  Preferred Language for Healthcare:   [x]English       []other:    Visit # Insurance Allowable   25 60     Pain level:  0/10     SUBJECTIVE:  Patient denies any real pain complaints today. [place] that last session was very appropriate, no increase in pain but did feel some fatigue. Feels that she is slowly getting stronger. Climbing steps is still challenging for her.      OBJECTIVE:    Observation: TFL / psoas / rectus tight/tender to palpation  Test measurements:        RESTRICTIONS/PRECAUTIONS: None    Exercises/Interventions:     Therapeutic Ex - 32 min Sets/sec Reps Notes   Bridge - DL 4 5 With bilat hip abd   Irish lunge 1 12 UE support, small red step   SL clam 1 1-    BOSU squat 5 10 Wall for support, orange band   Leg Press - SL 3 10 35#   Leg Press  - DL 2 10 80#, orange band   Band pull down / alt SLS 10 10 Red, modified with step   TRX squat - weight shift R 5s 15 Cues for squat positioning   Glute side walks 2 10 orange, below knees   RDL - DL 1 12 Yellow KB   Slide Lunge 1 10 Retro/lateral at counter   Step ups/ecc step down      [place] wall rolls- mobility, lifting and ambulation/stair navigation   [] (62835)Reviewed/Progressed HEP activities related to improving balance, coordination, kinesthetic sense, posture, motor skill, proprioception of core, proximal hip and LE for self care, mobility, lifting, and ambulation/stair navigation      Manual Treatments:  PROM / STM / Oscillations-Mobs:  G-I, II, III, IV (PA's, Inf., Post.)  [x] (16763) Provided manual therapy to mobilize LE, proximal hip and/or LS spine soft tissue/joints for the purpose of modulating pain, promoting relaxation,  increasing ROM, reducing/eliminating soft tissue swelling/inflammation/restriction, improving soft tissue extensibility and allowing for proper ROM for normal function with self care, mobility, lifting and ambulation. Modalities:  None    Charges:  Timed Code Treatment Minutes: 47   Total Treatment Minutes: 47     [] EVAL (LOW) 86366 (typically 20 minutes face-to-face)  [] EVAL (MOD) 00093 (typically 30 minutes face-to-face)  [] EVAL (HIGH) 79212 (typically 45 minutes face-to-face)  [] RE-EVAL     [x] DT(19581) x  2   [] IONTO  [] NMR (07169) x      [] VASO  [x] Manual (27144) x  1    [] Other:  [] TA x       [] Mech Traction (72964)  [] ES(attended) (49304)      [] ES (un) (88111):     GOALS:  Patient stated goal: pain-free, return to PLOF    Therapist goals for Patient:   Short Term Goals: To be achieved in: 2 weeks  1. Independent in HEP and progression per patient tolerance, in order to prevent re-injury. - 100% MET  2. Patient will have a decrease in pain to facilitate improvement in movement, function, and ADLs as indicated by Functional Deficits. - 75% MET    Long Term Goals: To be achieved in: 6 weeks  1. Disability index score of 18% or less for the LEFS to assist with reaching prior level of function. - 50% MET  2. Patient will demonstrate increased AROM to equal contralateral to allow for proper joint functioning as indicated by patients Functional Deficits.

## 2018-11-02 ENCOUNTER — HOSPITAL ENCOUNTER (OUTPATIENT)
Dept: PHYSICAL THERAPY | Age: 63
Setting detail: THERAPIES SERIES
Discharge: HOME | End: 2018-11-02
Admitting: ORTHOPAEDIC SURGERY
Payer: COMMERCIAL

## 2018-11-02 PROCEDURE — 97140 MANUAL THERAPY 1/> REGIONS: CPT

## 2018-11-02 PROCEDURE — 97110 THERAPEUTIC EXERCISES: CPT

## 2018-11-02 NOTE — FLOWSHEET NOTE
[facility] [address] [facility]  Phone: [phone] Fax: [phone]        Physical Therapy Daily Treatment Note        Date:  2018    Patient Name:  Joslyn Christie    :  1955  MRN: [id number]  Restrictions/Precautions:    Medical/Treatment Diagnosis Information:  · Diagnosis: Right hip pain, glutes medius tendinopathy, trochanteric bursitis  · Treatment Diagnosis: M25.551, M67.98, A56.82  Insurance/Certification information:  PT Insurance Information: UMR  Physician Information:  Referring Practitioner: Fanny Erickson  Plan of care signed (Y/N):     Date of Patient follow up with Physician:     G-Code (if applicable):      Date G-Code Applied:  10/8/18       Progress Note: [x]  Yes  []  No  Next due by: Visit #30       Latex Allergy:  [x]NO      []YES  Preferred Language for Healthcare:   [x]English       []other:    Visit # Insurance Allowable   26 60     Pain level:  0/10     SUBJECTIVE:  Patient states that she continues to feel that she is getting stronger. Did a lot of squatting while cleaning out her shower over the weekend and this felt really pretty good, no increase in pain. Stairs are still challenging for her. She also feels that her symptoms worsen a bit depending on the weather, because her anterior hip has been a little more tight since the weather turned cold and rainy.     OBJECTIVE:    Observation: TFL / psoas / rectus tight/tender to palpation  Test measurements:        RESTRICTIONS/PRECAUTIONS: None    Exercises/Interventions:     Therapeutic Ex - 32 min Sets/sec Reps Notes   Bridge - DL 4 5 With bilat hip abd   [place] lunge 1 12 UE support, small red step   SL clam 1 1-    BOSU squat 5 10 Wall for support, orange band   Leg Press - SL 3 10 35#   Leg Press  - DL 2 10 80#, orange band   Band pull down / alt SLS 10 10 Red, modified with step   TRX squat - weight shift R 5s 15 Cues for squat with reaching prior level of function. - 50% MET  2. Patient will demonstrate increased AROM to equal contralateral to allow for proper joint functioning as indicated by patients Functional Deficits. - 90% MET  3. Patient will demonstrate an increase in Strength to good proximal hip strength and control, within 5lb HHD in LE to allow for proper functional mobility as indicated by patients Functional Deficits. - 75% MET  4. Patient will return to prolonged standing, prolonged ambulation, squatting, stairs, kneeling, transfers functional activities without increased symptoms or restriction. - 75% MET  5. Patient will report ability to sleep throughout night without waking due to pain. - 90% MET    Progression Towards Functional goals:  [x] Patient is progressing as expected towards functional goals listed. [] Progression is slowed due to complexities listed. [] Progression has been slowed due to co-morbidities. [] Plan just implemented, too soon to assess goals progression  [] Other:     ASSESSMENT:   Needs improved glute med/max strength to improve pelvic stability in SLS and with squatting/lunging. Fatigues quickly with functional squatting exercises. Still deficient in ability to activate posterior chain with squats/lunges but getting better. Starting to progress into unilateral stance strengthening without increase in symptoms, was able to do a limited amount of reps of 4\" step ups today without increased pain, though she did need cuing to perform correctly.      Return to Play: (if applicable)   []  Stage 1: Intro to Strength   []  Stage 2: Return to Run and Strength   []  Stage 3: Return to Jump and Strength   []  Stage 4: Dynamic Strength and Agility   []  Stage 5: Sport Specific Training     []  Ready to Return to Play, Meets All Above Stages   []  Not Ready for Return to Sports   Comments:                         Treatment/Activity Tolerance:  [x] Patient tolerated treatment well [] Patient limited by

## 2018-11-05 ENCOUNTER — HOSPITAL ENCOUNTER (OUTPATIENT)
Dept: PHYSICAL THERAPY | Age: 63
Setting detail: THERAPIES SERIES
Discharge: HOME | End: 2018-11-05
Admitting: ORTHOPAEDIC SURGERY
Payer: COMMERCIAL

## 2018-11-05 PROCEDURE — 97140 MANUAL THERAPY 1/> REGIONS: CPT

## 2018-11-05 PROCEDURE — 97032 APPL MODALITY 1+ESTIM EA 15: CPT

## 2018-11-05 NOTE — FLOWSHEET NOTE
[place] [address] [facility] [address]  Phone: [phone] Fax: [phone]        Physical Therapy Daily Treatment Note        Date:  2018    Patient Name:  Malina Ramirez    :  1955  MRN: [id number]  Restrictions/Precautions:    Medical/Treatment Diagnosis Information:  · Diagnosis: Right hip pain, glutes medius tendinopathy, trochanteric bursitis  · Treatment Diagnosis: M25.551, M67.98, Z43.17  Insurance/Certification information:  PT Insurance Information: UMR  Physician Information:  Referring Practitioner: Valeriano Sylvester  Plan of care signed (Y/N):     Date of Patient follow up with Physician:     G-Code (if applicable):      Date G-Code Applied:  10/8/18       Progress Note: [x]  Yes  []  No  Next due by: Visit #30       Latex Allergy:  [x]NO      []YES  Preferred Language for Healthcare:   [x]English       []other:    Visit # Insurance Allowable   27 60     Pain level:  0/10     SUBJECTIVE:  Patient states that her anterior hip symptoms worsened a little over the weekend, feels that it was sore after her last session since she tried a few new exercises on the RLE. Muscles feeling tight enough there that she is interested in needling that region today. On a positive note, she states that she was able to wear high heels all night on Saturday while at a wedding, which normally she would have been unable to do.     OBJECTIVE:    Observation: TFL / psoas / rectus tight/tender to palpation  Test measurements:        RESTRICTIONS/PRECAUTIONS: None    Exercises/Interventions:     Therapeutic Ex - 0 min Sets/sec Reps Notes   Bridge - DL 4 5 With bilat hip abd   [place] lunge 1 12 UE support, small red step   SL clam 1 1-    BOSU squat 5 10 Wall for support, orange band   Leg Press - SL 3 10 35#   Leg Press  - DL 2 10 80#, orange band   Band pull down / alt SLS 10 10 Red, modified with step   TRX squat - weight shift R 5s 15 including up and down stairs     Home Exercise Program:    [x] (02297) Reviewed/Progressed HEP activities related to strengthening, flexibility, endurance, ROM of core, proximal hip and LE for functional self-care, mobility, lifting and ambulation/stair navigation   [] (68370)Reviewed/Progressed HEP activities related to improving balance, coordination, kinesthetic sense, posture, motor skill, proprioception of core, proximal hip and LE for self care, mobility, lifting, and ambulation/stair navigation      Manual Treatments:  PROM / STM / Oscillations-Mobs:  G-I, II, III, IV (PA's, Inf., Post.)  [x] (14803) Provided manual therapy to mobilize LE, proximal hip and/or LS spine soft tissue/joints for the purpose of modulating pain, promoting relaxation,  increasing ROM, reducing/eliminating soft tissue swelling/inflammation/restriction, improving soft tissue extensibility and allowing for proper ROM for normal function with self care, mobility, lifting and ambulation. Spoke with   regarding the use of Dry Needling     Dry needling manual therapy: consisted on the placement of 2 needles in the following muscles:  right psoas, rec fem. A 50-60 mm needle was inserted, piston, rotated, and coned to produce intramuscular mobilization. These techniques were used to restore functional range of motion, reduce muscle spasm and induce healing in the corresponding musculature. (84155)  Clean Technique was utilized today while applying Dry needling treatment. The treatment sites where cleaned with 70% solution of  isopropyl alcohol . The PT washed their hands and utilized treatment gloves along with hand  prior to inserting the needles. All needles where removed and discarded in the appropriate sharps container. MD has given verbal and/or written approval for this treatment.      Attended low frequency (1-20Hz) electrical stimulation was utilized in conjunction with Dry Needling:  the Estim was manipulated expected towards functional goals listed. [] Progression is slowed due to complexities listed. [] Progression has been slowed due to co-morbidities. [] Plan just implemented, too soon to assess goals progression  [] Other:     ASSESSMENT:   Good tolerance to dry needling today, reported improvement in symptoms at conclusion of session. Needs improved glute med/max strength to improve pelvic stability in SLS and with squatting/lunging. Fatigues quickly with functional squatting exercises. Still deficient in ability to activate posterior chain with squats/lunges but getting better. Starting to progress into unilateral stance strengthening with minimal increase in symptoms.      Return to Play: (if applicable)   []  Stage 1: Intro to Strength   []  Stage 2: Return to Run and Strength   []  Stage 3: Return to Jump and Strength   []  Stage 4: Dynamic Strength and Agility   []  Stage 5: Sport Specific Training     []  Ready to Return to Play, Meets All Above Stages   []  Not Ready for Return to Sports   Comments:                         Treatment/Activity Tolerance:  [x] Patient tolerated treatment well [] Patient limited by fatique  [] Patient limited by pain  [] Patient limited by other medical complications  [] Other:     Prognosis: [x] Good [] Fair  [] Poor    Patient Requires Follow-up: [x] Yes  [] No      PLAN: Manual, progress proximal hip strengthening as tolerated, dry needling/IASTM as needed  [x] Continue per plan of care [] Alter current plan (see comments)  [] Plan of care initiated [] Hold pending MD visit [] Discharge    Electronically signed by: Judah Callahan PT

## 2018-11-09 ENCOUNTER — HOSPITAL ENCOUNTER (OUTPATIENT)
Dept: PHYSICAL THERAPY | Age: 63
Setting detail: THERAPIES SERIES
Discharge: HOME | End: 2018-11-09
Admitting: ORTHOPAEDIC SURGERY
Payer: COMMERCIAL

## 2018-11-09 PROCEDURE — 97140 MANUAL THERAPY 1/> REGIONS: CPT

## 2018-11-09 PROCEDURE — 97110 THERAPEUTIC EXERCISES: CPT

## 2018-11-09 NOTE — FLOWSHEET NOTE
strengthening as tolerated, dry needling/IASTM as needed  [x] Continue per plan of care [] Alter current plan (see comments)  [] Plan of care initiated [] Hold pending MD visit [] Discharge    Electronically signed by: Danica Sanchez PT

## 2018-11-12 ENCOUNTER — HOSPITAL ENCOUNTER (OUTPATIENT)
Dept: PHYSICAL THERAPY | Age: 63
Setting detail: THERAPIES SERIES
Discharge: HOME | End: 2018-11-12
Admitting: ORTHOPAEDIC SURGERY
Payer: COMMERCIAL

## 2018-11-12 PROCEDURE — 97110 THERAPEUTIC EXERCISES: CPT

## 2018-11-12 PROCEDURE — 97140 MANUAL THERAPY 1/> REGIONS: CPT

## 2018-11-12 NOTE — FLOWSHEET NOTE
[place] [address] [facility] [address]  Phone: [phone] Fax: [phone]        Physical Therapy Daily Treatment Note        Date:  2018    Patient Name:  Mahesh Howell    :  1955  MRN: [id number]  Restrictions/Precautions:    Medical/Treatment Diagnosis Information:  · Diagnosis: Right hip pain, glutes medius tendinopathy, trochanteric bursitis  · Treatment Diagnosis: M25.551, M67.98, Y12.66  Insurance/Certification information:  PT Insurance Information: UMR  Physician Information:  Referring Practitioner: Timo Whyte  Plan of care signed (Y/N):     Date of Patient follow up with Physician:     G-Code (if applicable):      Date G-Code Applied:  10/8/18       Progress Note: [x]  Yes  []  No  Next due by: Visit #30       Latex Allergy:  [x]NO      []YES  Preferred Language for Healthcare:   [x]English       []other:    Visit # Insurance Allowable   29 60     Pain level:  0/10     SUBJECTIVE:  Patient states that her anterior hip definitely feels better today than it did last week. Still a little tight and tender to palpation, but better. Stairs are still hard for her to do due to lack of strength.        OBJECTIVE:    Observation: TFL / psoas / rectus tight/tender to palpation  Test measurements:        RESTRICTIONS/PRECAUTIONS: None    Exercises/Interventions:     Therapeutic Ex - 30 min Sets/sec Reps Notes   Bridge - DL 4 5    Estonian lunge 1 12 UE support, small red step   Ecc SLR over side of table 1 10 W/ assist   BOSU squat 5 10 Wall for support, orange band   Leg Press - SL 3 10 35#   Leg Press  - DL 2 10 80#, orange band   Band pull down / alt SLS 10 10 Red, modified with step   TRX squat - weight shift R 5s 15 Cues for squat positioning   Glute side walks 2 10 orange, below knees   RDL - DL 1 12 Yellow KB   Slide Lunge 1 10 Retro/lateral at counter   [place]ats 3 10 Aqua at knees, airex   Step ups/ecc step as needed  [x] Continue per plan of care [] Alter current plan (see comments)  [] Plan of care initiated [] Hold pending MD visit [] Discharge    Electronically signed by: Gibran Cobian PT

## 2018-11-16 ENCOUNTER — HOSPITAL ENCOUNTER (OUTPATIENT)
Dept: PHYSICAL THERAPY | Age: 63
Setting detail: THERAPIES SERIES
Discharge: HOME | End: 2018-11-16
Admitting: ORTHOPAEDIC SURGERY
Payer: COMMERCIAL

## 2018-11-16 PROCEDURE — G8978 MOBILITY CURRENT STATUS: HCPCS

## 2018-11-16 PROCEDURE — 97110 THERAPEUTIC EXERCISES: CPT

## 2018-11-16 PROCEDURE — 97140 MANUAL THERAPY 1/> REGIONS: CPT

## 2018-11-16 PROCEDURE — G8979 MOBILITY GOAL STATUS: HCPCS

## 2018-11-16 NOTE — PLAN OF CARE
[place] [address] [address]  Phone: [phone] Fax: [phone]        Physical Therapy Re-Certification Plan of Care/MD UPDATE      Dear Referring Practitioner: Sridhar Mirza,    We had the pleasure of treating the following patient for physical therapy services at [address] [place] [address]. A summary of our findings can be found in the updated assessment below. This includes our plan of care. If you have any questions or concerns regarding these findings, please do not hesitate to contact me at the office phone number checked above.   Thank you for the referral.     Physician Signature:________________________________Date:__________________  By signing above (or electronic signature), therapists plan is approved by physician    Date Range Of Visits: 18 - 18  Total Visits to Date: 27  Overall Response to Treatment:   [x]Patient is responding well to treatment and improvement is noted with regards  to goals   []Patient should continue to improve in reasonable time if they continue HEP   []Patient has plateaued and is no longer responding to skilled PT intervention    []Patient is getting worse and would benefit from return to referring MD   []Patient unable to adhere to initial POC   []Other:                   Date:  2018    Patient Name:  Abi Rowland    :  1955  MRN: [id number]  Restrictions/Precautions:    Medical/Treatment Diagnosis Information:  · Diagnosis: Right hip pain, glutes medius tendinopathy, trochanteric bursitis  · Treatment Diagnosis: M25.551, M67.98, I40.93  Insurance/Certification information:  PT Insurance Information: UMR  Physician Information:  Referring Practitioner: [address] of care signed (Y/N):     Date of Patient follow up with Physician:     G-Code (if applicable):      Date G-Code Applied:  18  PT G-Codes  Functional Assessment Tool

## 2018-11-19 ENCOUNTER — HOSPITAL ENCOUNTER (OUTPATIENT)
Dept: PHYSICAL THERAPY | Age: 63
Setting detail: THERAPIES SERIES
Discharge: HOME | End: 2018-11-19
Admitting: ORTHOPAEDIC SURGERY
Payer: COMMERCIAL

## 2018-11-19 PROCEDURE — 97140 MANUAL THERAPY 1/> REGIONS: CPT

## 2018-11-19 PROCEDURE — 97110 THERAPEUTIC EXERCISES: CPT

## 2018-11-19 NOTE — FLOWSHEET NOTE
ambulation re-education including up and down stairs     Home Exercise Program:    [x] (53927) Reviewed/Progressed HEP activities related to strengthening, flexibility, endurance, ROM of core, proximal hip and LE for functional self-care, mobility, lifting and ambulation/stair navigation   [] (72122)Reviewed/Progressed HEP activities related to improving balance, coordination, kinesthetic sense, posture, motor skill, proprioception of core, proximal hip and LE for self care, mobility, lifting, and ambulation/stair navigation      Manual Treatments:  PROM / STM / Oscillations-Mobs:  G-I, II, III, IV (PA's, Inf., Post.)  [x] (11164) Provided manual therapy to mobilize LE, proximal hip and/or LS spine soft tissue/joints for the purpose of modulating pain, promoting relaxation,  increasing ROM, reducing/eliminating soft tissue swelling/inflammation/restriction, improving soft tissue extensibility and allowing for proper ROM for normal function with self care, mobility, lifting and ambulation. Modalities:  None    Charges:  Timed Code Treatment Minutes: 45   Total Treatment Minutes: 45     [] EVAL (LOW) 04017 (typically 20 minutes face-to-face)  [] EVAL (MOD) 22678 (typically 30 minutes face-to-face)  [] EVAL (HIGH) 15412 (typically 45 minutes face-to-face)  [] RE-EVAL     [x] RO(05608) x  2   [] IONTO  [] NMR (92125) x      [] VASO  [x] Manual (87097) x  1    [] Other:  [] TA x       [] Mech Traction (46878)  [] ES(attended) (73726)      [] ES (un) (26105):     GOALS:  Patient stated goal: pain-free, return to PLOF    Therapist goals for Patient:   Short Term Goals: To be achieved in: 2 weeks  1. Independent in HEP and progression per patient tolerance, in order to prevent re-injury. - 100% MET  2. Patient will have a decrease in pain to facilitate improvement in movement, function, and ADLs as indicated by Functional Deficits. - 75% MET    Long Term Goals: To be achieved in: 6 weeks  1.  Disability index score

## 2018-11-23 ENCOUNTER — HOSPITAL ENCOUNTER (OUTPATIENT)
Dept: PHYSICAL THERAPY | Age: 63
Setting detail: THERAPIES SERIES
Discharge: HOME | End: 2018-11-23
Admitting: ORTHOPAEDIC SURGERY
Payer: COMMERCIAL

## 2018-11-23 PROCEDURE — 97110 THERAPEUTIC EXERCISES: CPT

## 2018-11-23 PROCEDURE — 97140 MANUAL THERAPY 1/> REGIONS: CPT

## 2018-11-26 ENCOUNTER — HOSPITAL ENCOUNTER (OUTPATIENT)
Dept: PHYSICAL THERAPY | Age: 63
Setting detail: THERAPIES SERIES
Discharge: HOME | End: 2018-11-26
Admitting: ORTHOPAEDIC SURGERY
Payer: COMMERCIAL

## 2018-11-26 PROCEDURE — 97140 MANUAL THERAPY 1/> REGIONS: CPT

## 2018-11-26 PROCEDURE — 97110 THERAPEUTIC EXERCISES: CPT

## 2018-11-26 NOTE — FLOWSHEET NOTE
mobility, lifting, and ambulation/stair navigation      Manual Treatments:  PROM / STM / Oscillations-Mobs:  G-I, II, III, IV (PA's, Inf., Post.)  [x] (27172) Provided manual therapy to mobilize LE, proximal hip and/or LS spine soft tissue/joints for the purpose of modulating pain, promoting relaxation,  increasing ROM, reducing/eliminating soft tissue swelling/inflammation/restriction, improving soft tissue extensibility and allowing for proper ROM for normal function with self care, mobility, lifting and ambulation. Modalities:  None    Charges:  Timed Code Treatment Minutes: 48   Total Treatment Minutes: 48     [] EVAL (LOW) 65823 (typically 20 minutes face-to-face)  [] EVAL (MOD) 31080 (typically 30 minutes face-to-face)  [] EVAL (HIGH) 19928 (typically 45 minutes face-to-face)  [] RE-EVAL     [x] IM(35212) x  2   [] IONTO  [] NMR (06717) x      [] VASO  [x] Manual (36067) x  1    [] Other:  [] TA x       [] Mech Traction (54165)  [] ES(attended) (92613)      [] ES (un) (78500):     GOALS:  Patient stated goal: pain-free, return to PLOF    Therapist goals for Patient:   Short Term Goals: To be achieved in: 2 weeks  1. Independent in HEP and progression per patient tolerance, in order to prevent re-injury. - 100% MET  2. Patient will have a decrease in pain to facilitate improvement in movement, function, and ADLs as indicated by Functional Deficits. - 75% MET    Long Term Goals: To be achieved in: 6 weeks  1. Disability index score of 18% or less for the LEFS to assist with reaching prior level of function. - 50% MET  2. Patient will demonstrate increased AROM to equal contralateral to allow for proper joint functioning as indicated by patients Functional Deficits. - 90% MET  3. Patient will demonstrate an increase in Strength to good proximal hip strength and control, within 5lb HHD in LE to allow for proper functional mobility as indicated by patients Functional Deficits. - 75% MET  4.  Patient will with Dr. Mary Nova  [x] Continue per plan of care [] Alter current plan (see comments)  [] Plan of care initiated [] Hold pending MD visit [] Discharge    Electronically signed by: Fahad Mckinney PT

## 2018-11-30 ENCOUNTER — HOSPITAL ENCOUNTER (OUTPATIENT)
Dept: PHYSICAL THERAPY | Age: 63
Setting detail: THERAPIES SERIES
Discharge: HOME | End: 2018-11-30
Admitting: ORTHOPAEDIC SURGERY
Payer: COMMERCIAL

## 2018-11-30 PROCEDURE — 97140 MANUAL THERAPY 1/> REGIONS: CPT

## 2018-11-30 PROCEDURE — 97032 APPL MODALITY 1+ESTIM EA 15: CPT

## 2018-12-03 ENCOUNTER — HOSPITAL ENCOUNTER (OUTPATIENT)
Dept: PHYSICAL THERAPY | Age: 63
Setting detail: THERAPIES SERIES
Discharge: HOME | End: 2018-12-03
Admitting: ORTHOPAEDIC SURGERY
Payer: COMMERCIAL

## 2018-12-03 PROCEDURE — 97140 MANUAL THERAPY 1/> REGIONS: CPT

## 2018-12-03 PROCEDURE — 97110 THERAPEUTIC EXERCISES: CPT

## 2018-12-07 ENCOUNTER — HOSPITAL ENCOUNTER (OUTPATIENT)
Dept: PHYSICAL THERAPY | Age: 63
Setting detail: THERAPIES SERIES
Discharge: HOME | End: 2018-12-07
Admitting: ORTHOPAEDIC SURGERY
Payer: COMMERCIAL

## 2018-12-07 PROCEDURE — 97032 APPL MODALITY 1+ESTIM EA 15: CPT

## 2018-12-07 PROCEDURE — 97140 MANUAL THERAPY 1/> REGIONS: CPT

## 2018-12-10 ENCOUNTER — OFFICE VISIT (OUTPATIENT)
Dept: ORTHOPEDIC SURGERY | Age: 63
End: 2018-12-10
Payer: COMMERCIAL

## 2018-12-10 ENCOUNTER — HOSPITAL ENCOUNTER (OUTPATIENT)
Dept: PHYSICAL THERAPY | Age: 63
Setting detail: THERAPIES SERIES
Discharge: HOME | End: 2018-12-10
Admitting: ORTHOPAEDIC SURGERY
Payer: COMMERCIAL

## 2018-12-10 VITALS
DIASTOLIC BLOOD PRESSURE: 64 MMHG | BODY MASS INDEX: 23.32 KG/M2 | HEIGHT: 65 IN | WEIGHT: 140 LBS | HEART RATE: 70 BPM | SYSTOLIC BLOOD PRESSURE: 102 MMHG

## 2018-12-10 DIAGNOSIS — M67.951 TENDINOPATHY OF RIGHT GLUTEUS MEDIUS: Primary | ICD-10-CM

## 2018-12-10 DIAGNOSIS — M70.61 TROCHANTERIC BURSITIS OF RIGHT HIP: ICD-10-CM

## 2018-12-10 PROCEDURE — G8979 MOBILITY GOAL STATUS: HCPCS

## 2018-12-10 PROCEDURE — 99214 OFFICE O/P EST MOD 30 MIN: CPT | Performed by: ORTHOPAEDIC SURGERY

## 2018-12-10 PROCEDURE — 97140 MANUAL THERAPY 1/> REGIONS: CPT

## 2018-12-10 PROCEDURE — 97110 THERAPEUTIC EXERCISES: CPT

## 2018-12-10 PROCEDURE — G8978 MOBILITY CURRENT STATUS: HCPCS

## 2018-12-10 NOTE — PROGRESS NOTES
Social History     Social History    Marital status:      Spouse name: N/A    Number of children: N/A    Years of education: N/A     Occupational History    Not on file. Social History Main Topics    Smoking status: Never Smoker    Smokeless tobacco: Never Used    Alcohol use Not on file    Drug use: Unknown    Sexual activity: Not on file     Other Topics Concern    Not on file     Social History Narrative    No narrative on file     No family history on file. Review of Systems:    Joe Payton's reported review of systems dated 7/24/18 has been reviewed and has been scanned into her medical record for today's visit. The scanned image can be found in media images folder. She was instructed to contact her primary care physician regarding ROS positives if not already being addressed during today's visit. Objective:   Physical Exam  Vital Signs:  /64   Pulse 70   Ht 5' 5\" (1.651 m)   Wt 140 lb (63.5 kg)   BMI 23.30 kg/m²     Constitution:  Generally, Andrey Cordero is [x] alert, [x] appears stated age, and [x] in no distress.   Her general body habitus is [] Cachectic  [] Thin  [x] Normal  [] Obese  [] Morbidly Obese    Head: [x] Normocephalic  Eyes: [x] Extra-occular muscles intact  Left Ear: [x] External Ear normal   Right Ear: [x] External Ear normal   Nose: [x] Normal  Mouth: [x] Oral mucosa moist  [x] No perioral lesions    Pulmonary: [x] Respirations unlabored and regular  Skin: [x] Warm [x] Well perfused     Psychiatric:   [x] Good judgement and insight  [x] Oriented to [x] person, [x] place, and [x] time  [x] Mood appropriate for circumstances    Gait:   Gait is [x] Normal  [] Impaired   Assistive Device: [] None  [] Knee Brace  [] Cane  [] Crutches   [] Yvonne [place]   [] Wheelchair  [] Other     ORTHOPAEDIC LS SPINE EXAM   Inspection:  [x] Skin intact without abrasion, lacerations, surgical scars, pigment changes, dimpling, hairy patches or rashes  [x] Normal this encounter. Today's prescription medications will be e-scribed (when appropriate) to the Patient's Preferred Pharmacy:   [address], [place] - [address] [phone]  [facility]  Phone: [phone] Fax: [phone]       Jb Byrnes PA-C  Physician Assistant, Orthopedic Surgery    During this examination, Jb Byrnes PA-C, functioned as a scribe for Dr. Camryn Boucher. This dictation was performed with a verbal recognition program (DRAGON) and it was checked for errors. It is possible that there are still dictated errors within this office note. If so, please bring any errors to my attention for an addendum. All efforts were made to ensure that this office note is accurate. Attending Attestation Note:    I, Dr. Camryn Boucher MD, personally performed the services described in this documentation as scribed by Jb Byrnes PA-C   in my presence, and it is both accurate and complete.       Camryn Boucher MD  Orthopaedic Surgeon, Sports Medicine  Director, Hip Arthroscopy and [address] [facility] [place]  Casandra Ball () - [phone]

## 2018-12-14 ENCOUNTER — APPOINTMENT (OUTPATIENT)
Dept: PHYSICAL THERAPY | Age: 63
End: 2018-12-14
Payer: COMMERCIAL

## 2018-12-14 ENCOUNTER — OFFICE VISIT (OUTPATIENT)
Dept: ORTHOPEDIC SURGERY | Age: 63
End: 2018-12-14
Payer: COMMERCIAL

## 2018-12-14 VITALS
DIASTOLIC BLOOD PRESSURE: 75 MMHG | BODY MASS INDEX: 23.32 KG/M2 | HEART RATE: 61 BPM | HEIGHT: 65 IN | WEIGHT: 140 LBS | SYSTOLIC BLOOD PRESSURE: 121 MMHG

## 2018-12-14 DIAGNOSIS — M70.61 GREATER TROCHANTERIC BURSITIS OF RIGHT HIP: ICD-10-CM

## 2018-12-14 DIAGNOSIS — M67.951 TENDINOPATHY OF RIGHT GLUTEUS MEDIUS: Primary | ICD-10-CM

## 2018-12-14 PROCEDURE — 99214 OFFICE O/P EST MOD 30 MIN: CPT | Performed by: ORTHOPAEDIC SURGERY

## 2018-12-14 PROCEDURE — 20610 DRAIN/INJ JOINT/BURSA W/O US: CPT | Performed by: ORTHOPAEDIC SURGERY

## 2018-12-14 NOTE — PROGRESS NOTES
Flexion contracture    Forward flexion: 110  Supine Internal rotation: 20 Negative labral stress test  Supine External rotation: 65  Abduction: 60  Adduction: 30    Palpation:   Moderate Tenderness over greater trochanter    Provocative Tests:  [x] Negative: Logroll, FADIR  Positive Tests:  [] Log Roll   [] Hector Test: ITB Tightness   [] FADIR Anterior impingement   [] Posterior Impingement    [] Shuck test for insufficient suction seal   [] Dial test for capsular insufficiency:    [] Resisted adduction for athletic pubalgia   [] Resisted curl up for athletic pubalgia     Motor Function:  [x] No gross motor weakness of hip [x] No gross motor weakness of knee  [x] No gross motor weakness of ankle    [x] No gross motor weakness of great toe    [x] Motor strength:   [x] Hip Flex [x] 5/5 [] 4/5 [] 3/5 [] 2/5 [] 1/5 [] 0/5   [x] Hip ABductors [x] 4+/5 [] 4/5 [] 3/5 [] 2/5 [] 1/5 [] 0/5   [x] Hip ADductors [x] 5/5 [] 4/5 [] 3/5 [] 2/5 [] 1/5 [] 0/5     Neurologic:   [x] Sensation to light touch intact  [x] Coordination / proprioception intact  Motor function intact L2-S1    Circulation:  [x] The limb is warm and well perfused. [x] Capillary refill is intact. [x] Edema:  [x] none  [] mild  [] moderate  [] severe     Data Reviewed:     XRays:  No new imaging studies were obtained today. Other Imaging:  Narrative   Site: [employer] [facility] #: [id number] #: [id number] Procedure: MR Right Hip w/o Contrast ; Reason for Exam: Dx: Tendinopathy of right gluteus medius, trochanteric bursitis of right hip, eval for abductor tear per script   This document is confidential medical information.  Unauthorized disclosure or use of this information is prohibited by law. If you are not the intended recipient of this document, please advise us by calling immediately [phone].       [employer] [address]           Patient Name: Joana Fall   Case ID: [id number]   Patient :

## 2018-12-21 ENCOUNTER — HOSPITAL ENCOUNTER (OUTPATIENT)
Dept: PHYSICAL THERAPY | Age: 63
Setting detail: THERAPIES SERIES
Discharge: HOME | End: 2018-12-21
Admitting: ORTHOPAEDIC SURGERY
Payer: COMMERCIAL

## 2018-12-21 PROCEDURE — 97110 THERAPEUTIC EXERCISES: CPT

## 2018-12-21 PROCEDURE — 97140 MANUAL THERAPY 1/> REGIONS: CPT

## 2018-12-21 NOTE — FLOWSHEET NOTE
Functional Deficits. - 90% MET  3. Patient will demonstrate an increase in Strength to good proximal hip strength and control, within 5lb HHD in LE to allow for proper functional mobility as indicated by patients Functional Deficits. - 75% MET  4. Patient will return to prolonged standing, prolonged ambulation, squatting, stairs, kneeling, transfers functional activities without increased symptoms or restriction. - 75% MET  5. Patient will report ability to sleep throughout night without waking due to pain. - 100% MET    Progression Towards Functional goals:  [x] Patient is progressing as expected towards functional goals listed. [] Progression is slowed due to complexities listed. [] Progression has been slowed due to co-morbidities. [] Plan just implemented, too soon to assess goals progression  [] Other:     ASSESSMENT:   Improved tolerance to hip strengthening exercises today, though still unable to perfor clamshell without hip flexor compensation. Needs improved glute med/max strength to improve pelvic stability in SLS and with squatting/lunging. Fatigues quickly with functional squatting exercises. Tends to activate hip flexors more than glutes with weight bearing exercises. Still having difficulty progressing unilateral limb strengthening as hip weakness too great to perform with good form and without aggravating symptoms.     Return to Play: (if applicable)   []  Stage 1: Intro to Strength   []  Stage 2: Return to Run and Strength   []  Stage 3: Return to Jump and Strength   []  Stage 4: Dynamic Strength and Agility   []  Stage 5: Sport Specific Training     []  Ready to Return to Play, Meets All Above Stages   []  Not Ready for Return to Sports   Comments:                         Treatment/Activity Tolerance:  [x] Patient tolerated treatment well [] Patient limited by fatique  [] Patient limited by pain  [] Patient limited by other medical complications  [] Other:     Prognosis: [x] Good [] Fair  []

## 2018-12-28 ENCOUNTER — HOSPITAL ENCOUNTER (OUTPATIENT)
Dept: PHYSICAL THERAPY | Age: 63
Setting detail: THERAPIES SERIES
Discharge: HOME | End: 2018-12-28
Admitting: ORTHOPAEDIC SURGERY
Payer: COMMERCIAL

## 2018-12-28 PROCEDURE — 97140 MANUAL THERAPY 1/> REGIONS: CPT

## 2018-12-28 PROCEDURE — 97110 THERAPEUTIC EXERCISES: CPT

## 2018-12-28 NOTE — FLOWSHEET NOTE
Manual Intervention - 14 min         IASTM / STM 8'   Right IT band, VL   Prone figure 4 mob 0'       Hip belt mobs 0'   Inferior, hip flexed to 110   Hip PROM/stretching 6'   Manual july stretch   DN    Held today             NMR re-education - 0 min         [place]/Biofeedback 10/10         BFR         G. Med activation/sidelying         G. Max Activation/prone 10 10 Prone, table bent   Hip Ext full ROM G. Activation         Bosu Bal and Prop- G Med         Single leg stance/Balance/Prop 10 6 Modified with step, cues   Bosu Retro G. Med act         Prone Hip froggers- sliders/elevated 10 8 Prone, table bent-cues for lumbar compensation                         Therapeutic Exercise and NMR EXR  [x] (91023) Provided verbal/tactile cueing for activities related to strengthening, flexibility, endurance, ROM for improvements in LE, proximal hip, and core control with self care, mobility, lifting, ambulation. [x] (47788) Provided verbal/tactile cueing for activities related to improving balance, coordination, kinesthetic sense, posture, motor skill, proprioception  to assist with LE, proximal hip, and core control in self care, mobility, lifting, ambulation and eccentric single leg control.      NMR and Therapeutic Activities:    [] (23827 or 01615) Provided verbal/tactile cueing for activities related to improving balance, coordination, kinesthetic sense, posture, motor skill, proprioception and motor activation to allow for proper function of core, proximal hip and LE with self care and ADLs  [] (22837) Gait Re-education- Provided training and instruction to the patient for proper LE, core and proximal hip recruitment and positioning and eccentric body weight control with ambulation re-education including up and down stairs     Home Exercise Program:    [x] (42256) Reviewed/Progressed HEP activities related to strengthening, flexibility, endurance, ROM of core, proximal hip and LE for functional self-care, mobility, lifting and ambulation/stair navigation   [] (03366)Reviewed/Progressed HEP activities related to improving balance, coordination, kinesthetic sense, posture, motor skill, proprioception of core, proximal hip and LE for self care, mobility, lifting, and ambulation/stair navigation      Manual Treatments:  PROM / STM / Oscillations-Mobs:  G-I, II, III, IV (PA's, Inf., Post.)  [x] (97048) Provided manual therapy to mobilize LE, proximal hip and/or LS spine soft tissue/joints for the purpose of modulating pain, promoting relaxation,  increasing ROM, reducing/eliminating soft tissue swelling/inflammation/restriction, improving soft tissue extensibility and allowing for proper ROM for normal function with self care, mobility, lifting and ambulation. Modalities:  None    Charges:  Timed Code Treatment Minutes: 44   Total Treatment Minutes: 44     [] EVAL (LOW) 06657 (typically 20 minutes face-to-face)  [] EVAL (MOD) 15544 (typically 30 minutes face-to-face)  [] EVAL (HIGH) 97099 (typically 45 minutes face-to-face)  [] RE-EVAL     [x] BM(69869) x  2   [] IONTO  [] NMR (10448) x      [] VASO  [x] Manual (87528) x  1    [] Other:  [] TA x       [] Mech Traction (47496)  [] ES(attended) (95766)      [] ES (un) (08632):     GOALS:  Patient stated goal: pain-free, return to PLOF    Therapist goals for Patient:   Short Term Goals: To be achieved in: 2 weeks  1. Independent in HEP and progression per patient tolerance, in order to prevent re-injury. - 100% MET  2. Patient will have a decrease in pain to facilitate improvement in movement, function, and ADLs as indicated by Functional Deficits. - 75% MET    Long Term Goals: To be achieved in: 6 weeks  1. Disability index score of 18% or less for the LEFS to assist with reaching prior level of function. - 50% MET  2.  Patient will demonstrate increased AROM to equal contralateral to allow for proper joint functioning as indicated by patients Functional

## 2018-12-31 ENCOUNTER — HOSPITAL ENCOUNTER (OUTPATIENT)
Dept: PHYSICAL THERAPY | Age: 63
Setting detail: THERAPIES SERIES
Discharge: HOME | End: 2018-12-31
Admitting: ORTHOPAEDIC SURGERY
Payer: COMMERCIAL

## 2018-12-31 PROCEDURE — 97110 THERAPEUTIC EXERCISES: CPT

## 2018-12-31 PROCEDURE — 97140 MANUAL THERAPY 1/> REGIONS: CPT

## 2020-07-06 ENCOUNTER — OFFICE VISIT (OUTPATIENT)
Dept: ENT CLINIC | Age: 65
End: 2020-07-06
Payer: MEDICARE

## 2020-07-06 ENCOUNTER — TELEPHONE (OUTPATIENT)
Dept: ENT CLINIC | Age: 65
End: 2020-07-06

## 2020-07-06 VITALS — TEMPERATURE: 97.7 F | HEART RATE: 77 BPM | SYSTOLIC BLOOD PRESSURE: 151 MMHG | DIASTOLIC BLOOD PRESSURE: 81 MMHG

## 2020-07-06 PROCEDURE — 4040F PNEUMOC VAC/ADMIN/RCVD: CPT | Performed by: OTOLARYNGOLOGY

## 2020-07-06 PROCEDURE — 3017F COLORECTAL CA SCREEN DOC REV: CPT | Performed by: OTOLARYNGOLOGY

## 2020-07-06 PROCEDURE — G8400 PT W/DXA NO RESULTS DOC: HCPCS | Performed by: OTOLARYNGOLOGY

## 2020-07-06 PROCEDURE — G8421 BMI NOT CALCULATED: HCPCS | Performed by: OTOLARYNGOLOGY

## 2020-07-06 PROCEDURE — 1090F PRES/ABSN URINE INCON ASSESS: CPT | Performed by: OTOLARYNGOLOGY

## 2020-07-06 PROCEDURE — 1036F TOBACCO NON-USER: CPT | Performed by: OTOLARYNGOLOGY

## 2020-07-06 PROCEDURE — 1123F ACP DISCUSS/DSCN MKR DOCD: CPT | Performed by: OTOLARYNGOLOGY

## 2020-07-06 PROCEDURE — G8427 DOCREV CUR MEDS BY ELIG CLIN: HCPCS | Performed by: OTOLARYNGOLOGY

## 2020-07-06 PROCEDURE — 99203 OFFICE O/P NEW LOW 30 MIN: CPT | Performed by: OTOLARYNGOLOGY

## 2020-07-06 RX ORDER — TRIAMCINOLONE ACETONIDE 55 UG/1
1 SPRAY, METERED NASAL DAILY
COMMUNITY

## 2020-07-06 RX ORDER — ESTRADIOL 0.5 MG/1
1 TABLET ORAL DAILY
COMMUNITY

## 2020-07-06 NOTE — PROGRESS NOTES
Tinnitus:     The patient presents with tinnitus. The onset of symptoms was abrupt 6 months ago ago with unchanged course since that time. There had been a bilateral lower pitched tinnitus attributed to eustachian tube dysfunction and allergies. This is distinctly different than her chief complaint today where the tinnitus is higher pitched and left-sided only. Patient describes the tinnitus as fluctuating located in the left ear. The quality is described as high pitch that sounds like roaring. The pattern is nonpulsatile with an intensity that is medium. Patient describes her level of annoyance as minimally annoying, intermittently aware. Associated symptoms include no , pain, dizziness, drainage or recurrent otitis. She is now wearing bilateral in the ear aids, and had some difficulty adjusting them to her ear canal.  Her audiologist had suspected the left tinnitus to be related to high-pitched hearing loss but could not definitively associate. There is not a history of noise exposure. There is  a history of grinding or clenching of teeth. There is not associated jaw popping. Her dentist has been concerned about boxing but did not prescribe a bite guard. There has not been ear trauma or barotrauma. There have not been issues with the cervical spine. Family history is negative family history for tinnitus. Previous treatments include none. Currently wearing bilateral in the ear aids    She is allergic to codeine and ciprofloxacin    Current medications are Allegra, Estrace, and Nasacort    The patient is a non smoker and is not exposed to second hand smoke or irritants. There have been no known contagious contacts. There are  no other symptoms referable to the upper aerodigestive tract. Review of systems   No fever, chills, constitutional symptoms  No change in vision  Denies difficulty chewing or swallowing. No sores have been seen in the mouth.  There has been no hoarseness or change in voice. There is no stridor or difficulty breathing. There is no past history of anterior neck or throat trauma. Exam:  Vitals as of today Growth Chart        /81    Pulse 77    Temp 97.7 °F (36.5 °C)        GENERAL:   The patient appears well developed and well nourished. The head is normocephalic and atraumatic; no facial scars or weakness are noted. The facial skeleton is normal.The voice has a normal quality and tonality to it. EARS:  The pinnae are normal bilaterally. The ear canals are clear with no cerumen or narrowing. Both eardrums are normal with good aeration of the middle ear space. There is no evidence of attic retraction pocket or cholesteatoma. The umbo and light reflex appear normal.     EYES:   The conjunctivae and lids appear normal. There is full extraocular movement                   JAWS/DENTITION:  There is full ROM of the  TM joints without crepitus either audible or palpable. The dentition is grossly normal, including the gingiva. SALIVARY GLANDS:  The parotid and submandibular glands are normal in appearance. There are no palpable stones or masses. Clear secretions emanate from both Marce's and Arelis's ducts. There is no periglandular adenopathy. NASAL:  The external nose including the dorsum, columella, alae, and nasion is unremarkable. The turbinates appear normal. The middle and inferior meatuses appeared clear. No polyps, ulceration, or mass are visualized either naris. The nasal septum is midline. NASOPHARYNX:  The mirror exam of the nasopharynx shows no mucosal disease or obstruction. ORAL/PHARYNGEAL:   No abnormal dryness or discrete mucosal lesions are seen at the lips, oral cavity, or oropharynx. There is full tongue mobility, and the fungiform and vallate papillae appear normal. The floor of the mouth is unremarkable. . The palatoglossal and palatopharyngeal folds, uvula, and soft palate do not obstruct the oropharynx.      NECK:  The

## 2020-08-24 ENCOUNTER — TELEPHONE (OUTPATIENT)
Dept: ENT CLINIC | Age: 65
End: 2020-08-24

## 2020-08-24 NOTE — TELEPHONE ENCOUNTER
Pt would like test results and wants to  Know if she will be able to schedule an apt (virtual or office) w dr Mike Contreras